# Patient Record
Sex: MALE | Race: BLACK OR AFRICAN AMERICAN | NOT HISPANIC OR LATINO | Employment: UNEMPLOYED | URBAN - METROPOLITAN AREA
[De-identification: names, ages, dates, MRNs, and addresses within clinical notes are randomized per-mention and may not be internally consistent; named-entity substitution may affect disease eponyms.]

---

## 2019-11-19 ENCOUNTER — HOSPITAL ENCOUNTER (EMERGENCY)
Facility: HOSPITAL | Age: 39
Discharge: HOME | End: 2019-11-19
Attending: EMERGENCY MEDICINE | Admitting: EMERGENCY MEDICINE
Payer: MEDICARE

## 2019-11-19 VITALS
HEART RATE: 80 BPM | OXYGEN SATURATION: 100 % | BODY MASS INDEX: 22 KG/M2 | WEIGHT: 136.91 LBS | RESPIRATION RATE: 20 BRPM | SYSTOLIC BLOOD PRESSURE: 143 MMHG | HEIGHT: 66 IN | DIASTOLIC BLOOD PRESSURE: 90 MMHG | TEMPERATURE: 96.7 F

## 2019-11-19 DIAGNOSIS — M62.838 TRAPEZIUS MUSCLE SPASM: Primary | ICD-10-CM

## 2019-11-19 LAB
ATRIAL RATE: 65 BPM
P AXIS: 31 DEGREES
PR INTERVAL: 140 MS
QRS AXIS: 27 DEGREES
QRSD INTERVAL: 80 MS
QT INTERVAL: 372 MS
QTC INTERVAL: 386 MS
T WAVE AXIS: 71 DEGREES
VENTRICULAR RATE: 65 BPM

## 2019-11-19 PROCEDURE — 96372 THER/PROPH/DIAG INJ SC/IM: CPT

## 2019-11-19 PROCEDURE — 99283 EMERGENCY DEPT VISIT LOW MDM: CPT

## 2019-11-19 PROCEDURE — 99284 EMERGENCY DEPT VISIT MOD MDM: CPT | Performed by: EMERGENCY MEDICINE

## 2019-11-19 PROCEDURE — 93010 ELECTROCARDIOGRAM REPORT: CPT | Performed by: INTERNAL MEDICINE

## 2019-11-19 PROCEDURE — 93005 ELECTROCARDIOGRAM TRACING: CPT

## 2019-11-19 RX ORDER — DIAZEPAM 2 MG/1
2 TABLET ORAL ONCE
Status: COMPLETED | OUTPATIENT
Start: 2019-11-19 | End: 2019-11-19

## 2019-11-19 RX ORDER — DEXAMETHASONE SODIUM PHOSPHATE 4 MG/ML
4 INJECTION, SOLUTION INTRA-ARTICULAR; INTRALESIONAL; INTRAMUSCULAR; INTRAVENOUS; SOFT TISSUE ONCE
Status: COMPLETED | OUTPATIENT
Start: 2019-11-19 | End: 2019-11-19

## 2019-11-19 RX ORDER — KETOROLAC TROMETHAMINE 30 MG/ML
30 INJECTION, SOLUTION INTRAMUSCULAR; INTRAVENOUS ONCE
Status: COMPLETED | OUTPATIENT
Start: 2019-11-19 | End: 2019-11-19

## 2019-11-19 RX ORDER — PHENYTOIN SODIUM 100 MG/1
100 CAPSULE, EXTENDED RELEASE ORAL EVERY 8 HOURS SCHEDULED
COMMUNITY

## 2019-11-19 RX ADMIN — DEXAMETHASONE SODIUM PHOSPHATE 4 MG: 4 INJECTION, SOLUTION INTRAMUSCULAR; INTRAVENOUS at 06:14

## 2019-11-19 RX ADMIN — DIAZEPAM 2 MG: 2 TABLET ORAL at 06:20

## 2019-11-19 RX ADMIN — KETOROLAC TROMETHAMINE 30 MG: 30 INJECTION, SOLUTION INTRAMUSCULAR; INTRAVENOUS at 06:15

## 2019-11-19 NOTE — ED PROVIDER NOTES
History  Chief Complaint   Patient presents with    Shoulder Pain     Left shoulder pain and left neck pain  Denies trauma     HPI    This is a very pleasant 42-year-old gentleman presents to the emergency department with left shoulder left chest PC is muscle spasm which occurred when he woke up this morning  Patient denies trauma  Patient denies doing any particular activity that could have exacerbated it  Pain radiates to the left shoulder up trapezius muscle of to the left side of the neck  Prior to Admission Medications   Prescriptions Last Dose Informant Patient Reported? Taking? phenytoin (DILANTIN) 100 mg ER capsule   Yes No   Sig: Take 100 mg by mouth every 8 (eight) hours       Facility-Administered Medications: None       Past Medical History:   Diagnosis Date    Anxiety     Depression     Head injury     Seizures ([facility] [address] )        Past Surgical History:   Procedure Laterality Date    BRAIN SURGERY         History reviewed  No pertinent family history  I have reviewed and agree with the history as documented  Social History     Tobacco Use    Smoking status: Current Every Day Smoker     Packs/day: 0 20     Types: Cigarettes    Smokeless tobacco: Never Used   Substance Use Topics    Alcohol use: Never     Frequency: Never    Drug use: Yes     Types: Marijuana        Review of Systems   Constitutional: Negative  HENT: Negative  Eyes: Negative  Respiratory: Negative  Cardiovascular: Negative  Gastrointestinal: Negative  Endocrine: Negative  Genitourinary: Negative  Musculoskeletal: Positive for neck pain and neck stiffness  Skin: Negative  Allergic/Immunologic: Negative  Neurological: Negative  Hematological: Negative  Psychiatric/Behavioral: Negative  Physical Exam  Physical Exam   Constitutional: He appears well-developed and well-nourished  HENT:   Head: Normocephalic and atraumatic     Right Ear: External ear normal    Left Ear: External ear normal    Nose: Nose normal    Mouth/Throat: Oropharynx is clear and moist    Eyes: Pupils are equal, round, and reactive to light  Conjunctivae and EOM are normal    Neck: Normal range of motion  Neck supple  Cardiovascular: Normal rate, regular rhythm and normal heart sounds  Pulmonary/Chest: Effort normal and breath sounds normal    Abdominal: Soft  Musculoskeletal: He exhibits tenderness  Right shoulder: He exhibits tenderness  He exhibits no swelling and no effusion  Arms:  Tissue texture changes with increased muscle spasm along the trapezius muscle group  Nursing note and vitals reviewed  Vital Signs  ED Triage Vitals [11/19/19 0531]   Temperature Pulse Respirations Blood Pressure SpO2   (!) 96 7 °F (35 9 °C) 80 20 143/90 100 %      Temp Source Heart Rate Source Patient Position - Orthostatic VS BP Location FiO2 (%)   Tympanic Monitor Sitting Left arm --      Pain Score       8           Vitals:    11/19/19 0531   BP: 143/90   Pulse: 80   Patient Position - Orthostatic VS: Sitting         Visual Acuity      ED Medications  Medications   diazepam (VALIUM) tablet 2 mg (has no administration in time range)   ketorolac (TORADOL) injection 30 mg (has no administration in time range)   dexamethasone (DECADRON) injection 4 mg (has no administration in time range)       Diagnostic Studies  Results Reviewed     None                 No orders to display              Procedures  ECG 12 Lead Documentation Only  Date/Time: 11/19/2019 5:47 AM  Performed by: Yordy Chong III, DO  Authorized by: Yordy Chong III, DO     Indications / Diagnosis:  L shoulder pain  ECG reviewed by me, the ED Provider: yes    Patient location:  ED  Comments:      I personally reviewed this EKG is performed the patient November 19, 2019 at 5:43 a m  EKG was interpreted by me at 5:47 a m   Normal sinus rhythm with a ventricular rate of 65 beats per minute  A p r n  Oval of 140 milliseconds    The QRS duration of 80 milliseconds  The QT interval 372 milliseconds with a QTC of 386 milliseconds  Please note there is diffuse ST flattening in the anterior lateral leads  ED Course                               MDM  Number of Diagnoses or Management Options  Trapezius muscle spasm:   Diagnosis management comments: This is a very pleasant 40-year-old gentle presents to the emergency department with left shoulder pain left trapezius muscle spasm left-sided the posterior neck pain  Patient denies any trauma recently to the area  Patient woke up like this did not take any medications to alleviate his pain prior to arrival to the emergency department  EKG showed no acute changes  Patient is denying any chest pain  Patient does have full range of motion but it is decreased  No clinical indication of per form an x-ray because patient has full range of motion is no history of trauma  Portions of the record may have been created with voice recognition software  Occasional wrong word or "sound a like" substitutions may have occurred due to the inherent limitations of voice recognition software  Read the chart carefully and recognize, using context, where substitutions have occurred  Counseling: I had a detailed discussion with the patient and/or guardian regarding: the historical points, exam findings, and any diagnostic results supporting the discharge diagnosis, lab results, radiology results, discharge instructions reviewed with patient and/or family/caregiver and understanding was verbalized   Instructions given to return to the emergency department if symptoms worsen or persist, or if there are any questions or concerns that arise at home        Disposition  Final diagnoses:   Trapezius muscle spasm     Time reflects when diagnosis was documented in both MDM as applicable and the Disposition within this note     Time User Action Codes Description Comment    11/19/2019  5:56 AM Bere Magana Add [A72 [id number]] Trapezius muscle spasm       ED Disposition     ED Disposition Condition Date/Time Comment    Discharge Stable Tue Nov 19, 2019  5:56 AM Marisol Prom discharge to home/self care  Follow-up Information     Follow up With Specialties Details Why Contact Info    [facility]   [address] [place] [address]  [place] [address] [facility]  [phone]            Patient's Medications   Discharge Prescriptions    No medications on file     No discharge procedures on file      ED Provider  Electronically Signed by           Timmy Wallace III, DO  11/19/19 6212

## 2019-11-24 ENCOUNTER — HOSPITAL ENCOUNTER (EMERGENCY)
Facility: HOSPITAL | Age: 39
Discharge: HOME | End: 2019-11-24
Attending: EMERGENCY MEDICINE
Payer: MEDICARE

## 2019-11-24 ENCOUNTER — APPOINTMENT (EMERGENCY)
Dept: RADIOLOGY | Facility: HOSPITAL | Age: 39
End: 2019-11-24
Payer: MEDICARE

## 2019-11-24 VITALS
TEMPERATURE: 96.1 F | RESPIRATION RATE: 28 BRPM | SYSTOLIC BLOOD PRESSURE: 133 MMHG | BODY MASS INDEX: 21.35 KG/M2 | OXYGEN SATURATION: 100 % | WEIGHT: 132.28 LBS | DIASTOLIC BLOOD PRESSURE: 81 MMHG | HEART RATE: 90 BPM

## 2019-11-24 DIAGNOSIS — F41.9 ANXIETY: Primary | ICD-10-CM

## 2019-11-24 LAB
ANION GAP SERPL CALCULATED.3IONS-SCNC: 9 MMOL/L (ref 5–14)
BASOPHILS # BLD AUTO: 0.09 THOUSAND/UL (ref 0–0.1)
BASOPHILS NFR MAR MANUAL: 2 % (ref 0–1)
BUN SERPL-MCNC: 12 MG/DL (ref 5–25)
CALCIUM SERPL-MCNC: 9.1 MG/DL (ref 8.4–10.2)
CHLORIDE SERPL-SCNC: 107 MMOL/L (ref 97–108)
CO2 SERPL-SCNC: 28 MMOL/L (ref 22–30)
CREAT SERPL-MCNC: 0.74 MG/DL (ref 0.7–1.5)
EOSINOPHIL # BLD AUTO: 0.05 THOUSAND/UL (ref 0–0.4)
EOSINOPHIL NFR BLD MANUAL: 1 % (ref 0–6)
ERYTHROCYTE [DISTWIDTH] IN BLOOD BY AUTOMATED COUNT: 14.3 %
ETHANOL SERPL-MCNC: 42 MG/DL (ref 0–10)
GFR SERPL CREATININE-BSD FRML MDRD: 132 ML/MIN/1.73SQ M
GLUCOSE SERPL-MCNC: 124 MG/DL (ref 70–99)
HCT VFR BLD AUTO: 38.8 % (ref 41–53)
HGB BLD-MCNC: 12 G/DL (ref 13.5–17.5)
LYMPHOCYTES # BLD AUTO: 2.21 THOUSAND/UL (ref 0.5–4)
LYMPHOCYTES # BLD AUTO: 48 % (ref 25–45)
MCH RBC QN AUTO: 23.5 PG (ref 26–34)
MCHC RBC AUTO-ENTMCNC: 30.9 G/DL (ref 31–36)
MCV RBC AUTO: 76 FL (ref 80–100)
MICROCYTES BLD QL AUTO: PRESENT
MONOCYTES # BLD AUTO: 0.32 THOUSAND/UL (ref 0.2–0.9)
MONOCYTES NFR BLD AUTO: 7 % (ref 1–10)
NEUTS SEG # BLD: 1.89 THOUSAND/UL (ref 1.8–7.8)
NEUTS SEG NFR BLD AUTO: 41 %
PLATELET # BLD AUTO: 129 THOUSANDS/UL (ref 150–450)
PLATELET BLD QL SMEAR: ABNORMAL
PMV BLD AUTO: 9.6 FL (ref 8.9–12.7)
POTASSIUM SERPL-SCNC: 4.3 MMOL/L (ref 3.6–5)
RBC # BLD AUTO: 5.09 MILLION/UL (ref 4.5–5.9)
RBC MORPH BLD: ABNORMAL
SODIUM SERPL-SCNC: 144 MMOL/L (ref 137–147)
TARGETS BLD QL SMEAR: PRESENT
TOTAL CELLS COUNTED SPEC: 100
TROPONIN I SERPL-MCNC: <0.01 NG/ML (ref 0–0.03)
VARIANT LYMPHS # BLD AUTO: 1 % (ref 0–0)
WBC # BLD AUTO: 4.6 THOUSAND/UL (ref 4.5–11)

## 2019-11-24 PROCEDURE — 93005 ELECTROCARDIOGRAM TRACING: CPT

## 2019-11-24 PROCEDURE — 96374 THER/PROPH/DIAG INJ IV PUSH: CPT

## 2019-11-24 PROCEDURE — 85007 BL SMEAR W/DIFF WBC COUNT: CPT | Performed by: EMERGENCY MEDICINE

## 2019-11-24 PROCEDURE — 84484 ASSAY OF TROPONIN QUANT: CPT | Performed by: EMERGENCY MEDICINE

## 2019-11-24 PROCEDURE — 80048 BASIC METABOLIC PNL TOTAL CA: CPT | Performed by: EMERGENCY MEDICINE

## 2019-11-24 PROCEDURE — 99284 EMERGENCY DEPT VISIT MOD MDM: CPT | Performed by: EMERGENCY MEDICINE

## 2019-11-24 PROCEDURE — 80320 DRUG SCREEN QUANTALCOHOLS: CPT | Performed by: EMERGENCY MEDICINE

## 2019-11-24 PROCEDURE — 85027 COMPLETE CBC AUTOMATED: CPT | Performed by: EMERGENCY MEDICINE

## 2019-11-24 PROCEDURE — 36415 COLL VENOUS BLD VENIPUNCTURE: CPT | Performed by: EMERGENCY MEDICINE

## 2019-11-24 PROCEDURE — 71045 X-RAY EXAM CHEST 1 VIEW: CPT

## 2019-11-24 PROCEDURE — 99285 EMERGENCY DEPT VISIT HI MDM: CPT

## 2019-11-24 RX ORDER — HYDROXYZINE HYDROCHLORIDE 25 MG/1
25 TABLET, FILM COATED ORAL EVERY 6 HOURS PRN
Qty: 16 TABLET | Refills: 0 | Status: SHIPPED | OUTPATIENT
Start: 2019-11-24

## 2019-11-24 RX ORDER — LORAZEPAM 2 MG/ML
1 INJECTION INTRAMUSCULAR ONCE
Status: COMPLETED | OUTPATIENT
Start: 2019-11-24 | End: 2019-11-24

## 2019-11-24 RX ADMIN — LORAZEPAM 1 MG: 2 INJECTION INTRAMUSCULAR at 07:48

## 2019-11-24 NOTE — ED PROVIDER NOTES
History  Chief Complaint   Patient presents with    Shortness of Breath     " woke up this way"  c/o trouble breathing this morning  denies injury  denies cough     Patient is a 27-year-old male with history of anxiety and previous traumatic brain injury who presents with acute onset of dyspnea this morning  States woke up short of breath  Denies any history of asthma  No similar symptoms  Admits that he went out last night only had drink  Denies any other drug use  States smokes upwards of only 3 cigarettes a day  Denies any chest pain  No cough  No nasal congestion  No history of similar symptoms  Nothing makes it better or worse  Prior to Admission Medications   Prescriptions Last Dose Informant Patient Reported? Taking? phenytoin (DILANTIN) 100 mg ER capsule   Yes No   Sig: Take 100 mg by mouth every 8 (eight) hours       Facility-Administered Medications: None       Past Medical History:   Diagnosis Date    Anxiety     Depression     Head injury     Seizures ([facility] [address] )        Past Surgical History:   Procedure Laterality Date    BRAIN SURGERY         History reviewed  No pertinent family history  I have reviewed and agree with the history as documented  Social History     Tobacco Use    Smoking status: Current Every Day Smoker     Packs/day: 0 20     Types: Cigarettes    Smokeless tobacco: Never Used   Substance Use Topics    Alcohol use: Yes     Frequency: Never    Drug use: Yes     Types: Marijuana        Review of Systems   Constitutional: Negative  HENT: Negative  Eyes: Negative  Respiratory: Positive for shortness of breath  Cardiovascular: Negative  Negative for chest pain  Gastrointestinal: Negative  Negative for abdominal pain  Endocrine: Negative  Genitourinary: Negative  Musculoskeletal: Negative  Skin: Negative  Allergic/Immunologic: Negative  Neurological: Negative  Hematological: Negative  Psychiatric/Behavioral: Negative      All other systems reviewed and are negative  Physical Exam  Physical Exam   Constitutional: He is oriented to person, place, and time  He appears well-developed and well-nourished  Patient has smell of alcohol on his breath  HENT:   Head: Normocephalic and atraumatic  Mouth/Throat: Oropharynx is clear and moist    Eyes: Pupils are equal, round, and reactive to light  Neck: Normal range of motion  Neck supple  Cardiovascular: Normal rate, regular rhythm and normal heart sounds  Pulmonary/Chest: Breath sounds normal  Tachypnea noted  He has no decreased breath sounds  He has no wheezes  He has no rhonchi  He has no rales  Abdominal: Soft  Bowel sounds are normal    Musculoskeletal: Normal range of motion  Right lower leg: Normal  He exhibits no tenderness and no edema  Left lower leg: Normal  He exhibits no tenderness and no edema  Neurological: He is alert and oriented to person, place, and time  Skin: Skin is warm and dry  Capillary refill takes less than 2 seconds  Psychiatric: His mood appears anxious  Nursing note and vitals reviewed        Vital Signs  ED Triage Vitals [11/24/19 0720]   Temperature Pulse Respirations Blood Pressure SpO2   (!) 96 1 °F (35 6 °C) 90 (!) 28 133/81 100 %      Temp Source Heart Rate Source Patient Position - Orthostatic VS BP Location FiO2 (%)   Tympanic Monitor Sitting Left arm --      Pain Score       --           Vitals:    11/24/19 0720   BP: 133/81   Pulse: 90   Patient Position - Orthostatic VS: Sitting         Visual Acuity      ED Medications  Medications   LORazepam (ATIVAN) 2 mg/mL injection 1 mg (1 mg Intravenous Given 11/24/19 0748)       Diagnostic Studies  Results Reviewed     Procedure Component Value Units Date/Time    Troponin I [[id number]]  (Normal) Collected:  11/24/19 0734    Lab Status:  Final result Specimen:  Blood from Arm, Left Updated:  11/24/19 0808     Troponin I <0 01 ng/mL     Basic metabolic panel [[id number]] (Abnormal) Collected:  11/24/19 0734    Lab Status:  Final result Specimen:  Blood from Arm, Left Updated:  11/24/19 0753     Sodium 144 mmol/L      Potassium 4 3 mmol/L      Chloride 107 mmol/L      CO2 28 mmol/L      ANION GAP 9 mmol/L      BUN 12 mg/dL      Creatinine 0 74 mg/dL      Glucose 124 mg/dL      Calcium 9 1 mg/dL      eGFR 132 ml/min/1 73sq m     Narrative:       Hemolysis  National Kidney Disease Foundation guidelines for Chronic Kidney Disease (CKD):     Stage 1 with normal or high GFR (GFR > 90 mL/min/1 73 square meters)    Stage 2 Mild CKD (GFR = 60-89 mL/min/1 73 square meters)    Stage 3A Moderate CKD (GFR = 45-59 mL/min/1 73 square meters)    Stage 3B Moderate CKD (GFR = 30-44 mL/min/1 73 square meters)    Stage 4 Severe CKD (GFR = 15-29 mL/min/1 73 square meters)    Stage 5 End Stage CKD (GFR <15 mL/min/1 73 square meters)  Note: GFR calculation is accurate only with a steady state creatinine    Ethanol [[id number]]  (Abnormal) Collected:  11/24/19 0734    Lab Status:  Final result Specimen:  Blood from Arm, Left Updated:  11/24/19 0752     Ethanol Lvl 42 mg/dL     CBC and differential [[id number]]  (Abnormal) Collected:  11/24/19 0734    Lab Status:  Final result Specimen:  Blood from Arm, Left Updated:  11/24/19 0747     WBC 4 60 Thousand/uL      RBC 5 09 Million/uL      Hemoglobin 12 0 g/dL      Hematocrit 38 8 %      MCV 76 fL      MCH 23 5 pg      MCHC 30 9 g/dL      RDW 14 3 %      MPV 9 6 fL      Platelets 294 Thousands/uL                  XR chest portable   Final Result by Lamar Engel MD (11/24 0468)      No acute cardiopulmonary disease  Workstation performed: [id number]                    Procedures  Procedures       ED Course  ED Course as of Nov 24 0906   Fay Nnamdi Nov 24, 2019   9700 Tachypnea not consistent  When patient is distracted breathing slowed  [facility] with patient  One over results  Most likely anxiety at this time    Patient asking for medication  Will discharge with hydroxyzine follow up with his PCP on [address]  Return to the ER for any concerns  MDM  Number of Diagnoses or Management Options  Anxiety:      Amount and/or Complexity of Data Reviewed  Clinical lab tests: ordered and reviewed  Tests in the radiology section of CPT®: ordered and reviewed  Tests in the medicine section of CPT®: ordered and reviewed  Independent visualization of images, tracings, or specimens: yes        Disposition  Final diagnoses:   Anxiety     Time reflects when diagnosis was documented in both MDM as applicable and the Disposition within this note     Time User Action Codes Description Comment    11/24/2019  9:04 AM Adriana Virgen Add [F41 9] Anxiety       ED Disposition     ED Disposition Condition Date/Time Comment    Discharge Stable Sun Nov 24, 2019  9:04 AM Lashanda Sterling discharge to home/self care  Follow-up Information     Follow up With Specialties Details Why Contact Info        Please follow up with your doctor  Patient's Medications   Discharge Prescriptions    HYDROXYZINE HCL (ATARAX) 25 MG TABLET    Take 1 tablet (25 mg total) by mouth every 6 (six) hours as needed for itching       Start Date: 11/24/2019End Date: --       Order Dose: 25 mg       Quantity: 16 tablet    Refills: 0     No discharge procedures on file      ED Provider  Electronically Signed by           Fatmata Powers MD  11/24/19 6266

## 2019-11-24 NOTE — ED PROCEDURE NOTE
PROCEDURE  ECG 12 Lead Documentation Only  Date/Time: 11/24/2019 7:36 AM  Performed by: David Cantrell MD  Authorized by: David Cantrell MD     Indications / Diagnosis:  Dyspnea  ECG reviewed by me, the ED Provider: yes    Patient location:  ED  Interpretation:     Interpretation: normal    Rate:     ECG rate:  75    ECG rate assessment: normal    Rhythm:     Rhythm: sinus rhythm    Ectopy:     Ectopy: none    QRS:     QRS axis:  Normal    QRS intervals:  Normal  Conduction:     Conduction: normal    ST segments:     ST segments:  Normal  T waves:     T waves: normal           David Cantrell MD  11/24/19 9229

## 2019-11-25 LAB
ATRIAL RATE: 75 BPM
P AXIS: 61 DEGREES
PR INTERVAL: 118 MS
QRS AXIS: 31 DEGREES
QRSD INTERVAL: 76 MS
QT INTERVAL: 376 MS
QTC INTERVAL: 419 MS
T WAVE AXIS: 78 DEGREES
VENTRICULAR RATE: 75 BPM

## 2019-11-25 PROCEDURE — 93010 ELECTROCARDIOGRAM REPORT: CPT | Performed by: INTERNAL MEDICINE

## 2022-07-12 ENCOUNTER — HOSPITAL ENCOUNTER (EMERGENCY)
Facility: HOSPITAL | Age: 42
Discharge: HOME | End: 2022-07-12
Attending: EMERGENCY MEDICINE
Payer: MEDICARE

## 2022-07-12 ENCOUNTER — APPOINTMENT (EMERGENCY)
Dept: RADIOLOGY | Facility: HOSPITAL | Age: 42
End: 2022-07-12
Payer: MEDICARE

## 2022-07-12 VITALS
SYSTOLIC BLOOD PRESSURE: 133 MMHG | TEMPERATURE: 97.4 F | DIASTOLIC BLOOD PRESSURE: 90 MMHG | HEART RATE: 80 BPM | OXYGEN SATURATION: 99 % | RESPIRATION RATE: 17 BRPM

## 2022-07-12 DIAGNOSIS — S61.411A LACERATION OF RIGHT HAND WITHOUT FOREIGN BODY, INITIAL ENCOUNTER: Primary | ICD-10-CM

## 2022-07-12 PROCEDURE — 73130 X-RAY EXAM OF HAND: CPT

## 2022-07-12 PROCEDURE — 90715 TDAP VACCINE 7 YRS/> IM: CPT | Performed by: EMERGENCY MEDICINE

## 2022-07-12 PROCEDURE — 99284 EMERGENCY DEPT VISIT MOD MDM: CPT | Performed by: EMERGENCY MEDICINE

## 2022-07-12 PROCEDURE — 90471 IMMUNIZATION ADMIN: CPT

## 2022-07-12 PROCEDURE — 12041 INTMD RPR N-HF/GENIT 2.5CM/<: CPT | Performed by: EMERGENCY MEDICINE

## 2022-07-12 PROCEDURE — 99283 EMERGENCY DEPT VISIT LOW MDM: CPT

## 2022-07-12 RX ORDER — BACITRACIN, NEOMYCIN, POLYMYXIN B 400; 3.5; 5 [USP'U]/G; MG/G; [USP'U]/G
1 OINTMENT TOPICAL ONCE
Status: COMPLETED | OUTPATIENT
Start: 2022-07-12 | End: 2022-07-12

## 2022-07-12 RX ORDER — LIDOCAINE HYDROCHLORIDE AND EPINEPHRINE 10; 10 MG/ML; UG/ML
1 INJECTION, SOLUTION INFILTRATION; PERINEURAL ONCE
Status: COMPLETED | OUTPATIENT
Start: 2022-07-12 | End: 2022-07-12

## 2022-07-12 RX ADMIN — LIDOCAINE HYDROCHLORIDE,EPINEPHRINE BITARTRATE 1 ML: 10; .01 INJECTION, SOLUTION INFILTRATION; PERINEURAL at 05:47

## 2022-07-12 RX ADMIN — BACITRACIN ZINC, NEOMYCIN, POLYMYXIN B 1 SMALL APPLICATION: 400; 3.5; 5 OINTMENT TOPICAL at 05:48

## 2022-07-12 RX ADMIN — TETANUS TOXOID, REDUCED DIPHTHERIA TOXOID AND ACELLULAR PERTUSSIS VACCINE, ADSORBED 0.5 ML: 5; 2.5; 8; 8; 2.5 SUSPENSION INTRAMUSCULAR at 04:48
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
## 2022-07-12 NOTE — Clinical Note
Kaleigh Wallace was seen and treated in our emergency department on 7/12/2022  Diagnosis:     Alma Crum  may return to work on return date  He may return on this date: 07/19/2022         If you have any questions or concerns, please don't hesitate to call        Vitor Fink RN    ______________________________           _______________          _______________  Hospital Representative                              Date                                Time

## 2022-07-13 ENCOUNTER — HOSPITAL ENCOUNTER (EMERGENCY)
Facility: HOSPITAL | Age: 42
Discharge: HOME | End: 2022-07-13
Attending: EMERGENCY MEDICINE
Payer: MEDICARE

## 2022-07-13 VITALS
DIASTOLIC BLOOD PRESSURE: 95 MMHG | OXYGEN SATURATION: 99 % | SYSTOLIC BLOOD PRESSURE: 128 MMHG | WEIGHT: 142.1 LBS | BODY MASS INDEX: 22.94 KG/M2 | RESPIRATION RATE: 18 BRPM | TEMPERATURE: 97.5 F | HEART RATE: 92 BPM

## 2022-07-13 DIAGNOSIS — Z51.89 VISIT FOR WOUND CHECK: Primary | ICD-10-CM

## 2022-07-13 PROCEDURE — 99024 POSTOP FOLLOW-UP VISIT: CPT | Performed by: EMERGENCY MEDICINE

## 2022-07-13 PROCEDURE — 99282 EMERGENCY DEPT VISIT SF MDM: CPT

## 2022-07-13 RX ORDER — TRAMADOL HYDROCHLORIDE 50 MG/1
50 TABLET ORAL EVERY 8 HOURS PRN
Qty: 9 TABLET | Refills: 0 | Status: SHIPPED | OUTPATIENT
Start: 2022-07-13 | End: 2022-07-16
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
## 2022-07-13 NOTE — Clinical Note
Deshawn Lee was seen and treated in our emergency department on 7/13/2022  Diagnosis:     Abhinav Elver    He may return on this date: 07/16/2022         If you have any questions or concerns, please don't hesitate to call        Heber Burden,     ______________________________           _______________          _______________  Hospital Representative                              Date                                Time

## 2022-08-21 ENCOUNTER — HOSPITAL ENCOUNTER (EMERGENCY)
Facility: HOSPITAL | Age: 42
Discharge: HOME | End: 2022-08-21
Attending: EMERGENCY MEDICINE
Payer: MEDICARE

## 2022-08-21 ENCOUNTER — APPOINTMENT (OUTPATIENT)
Dept: RADIOLOGY | Facility: HOSPITAL | Age: 42
End: 2022-08-21
Payer: MEDICARE

## 2022-08-21 ENCOUNTER — APPOINTMENT (EMERGENCY)
Dept: CT IMAGING | Facility: HOSPITAL | Age: 42
End: 2022-08-21
Payer: MEDICARE

## 2022-08-21 VITALS
HEIGHT: 66 IN | BODY MASS INDEX: 23.13 KG/M2 | DIASTOLIC BLOOD PRESSURE: 52 MMHG | SYSTOLIC BLOOD PRESSURE: 125 MMHG | HEART RATE: 91 BPM | OXYGEN SATURATION: 100 % | RESPIRATION RATE: 18 BRPM | TEMPERATURE: 98.1 F

## 2022-08-21 DIAGNOSIS — R53.1 WEAKNESS: Primary | ICD-10-CM

## 2022-08-21 DIAGNOSIS — G40.909 SEIZURE DISORDER (HCC): ICD-10-CM

## 2022-08-21 LAB
ALBUMIN SERPL BCP-MCNC: 3.8 G/DL (ref 3.5–5)
ALP SERPL-CCNC: 65 U/L (ref 34–104)
ALT SERPL W P-5'-P-CCNC: 15 U/L (ref 7–52)
ANION GAP SERPL CALCULATED.3IONS-SCNC: 7 MMOL/L (ref 4–13)
APAP SERPL-MCNC: <10 UG/ML (ref 10–20)
AST SERPL W P-5'-P-CCNC: 16 U/L (ref 13–39)
ATRIAL RATE: 87 BPM
BASOPHILS # BLD AUTO: 0.03 THOUSANDS/ΜL (ref 0–0.1)
BASOPHILS NFR BLD AUTO: 1 % (ref 0–1)
BILIRUB SERPL-MCNC: 0.38 MG/DL (ref 0.2–1)
BUN SERPL-MCNC: 8 MG/DL (ref 5–25)
CALCIUM SERPL-MCNC: 8.3 MG/DL (ref 8.4–10.2)
CARDIAC TROPONIN I PNL SERPL HS: 3 NG/L
CHLORIDE SERPL-SCNC: 107 MMOL/L (ref 96–108)
CO2 SERPL-SCNC: 26 MMOL/L (ref 21–32)
CREAT SERPL-MCNC: 0.69 MG/DL (ref 0.6–1.3)
EOSINOPHIL # BLD AUTO: 0.12 THOUSAND/ΜL (ref 0–0.61)
EOSINOPHIL NFR BLD AUTO: 2 % (ref 0–6)
ERYTHROCYTE [DISTWIDTH] IN BLOOD BY AUTOMATED COUNT: 14.4 % (ref 11.6–15.1)
ETHANOL SERPL-MCNC: 21 MG/DL
GFR SERPL CREATININE-BSD FRML MDRD: 115 ML/MIN/1.73SQ M
GLUCOSE SERPL-MCNC: 101 MG/DL (ref 65–140)
GLUCOSE SERPL-MCNC: 75 MG/DL (ref 65–140)
HCT VFR BLD AUTO: 36.4 % (ref 36.5–49.3)
HGB BLD-MCNC: 11.1 G/DL (ref 12–17)
IMM GRANULOCYTES # BLD AUTO: 0.01 THOUSAND/UL (ref 0–0.2)
IMM GRANULOCYTES NFR BLD AUTO: 0 % (ref 0–2)
LYMPHOCYTES # BLD AUTO: 2.12 THOUSANDS/ΜL (ref 0.6–4.47)
LYMPHOCYTES NFR BLD AUTO: 38 % (ref 14–44)
MCH RBC QN AUTO: 23.7 PG (ref 26.8–34.3)
MCHC RBC AUTO-ENTMCNC: 30.5 G/DL (ref 31.4–37.4)
MCV RBC AUTO: 78 FL (ref 82–98)
MONOCYTES # BLD AUTO: 0.64 THOUSAND/ΜL (ref 0.17–1.22)
MONOCYTES NFR BLD AUTO: 12 % (ref 4–12)
NEUTROPHILS # BLD AUTO: 2.62 THOUSANDS/ΜL (ref 1.85–7.62)
NEUTS SEG NFR BLD AUTO: 47 % (ref 43–75)
NRBC BLD AUTO-RTO: 0 /100 WBCS
P AXIS: 60 DEGREES
PHENYTOIN SERPL-MCNC: <2.5 UG/ML (ref 10–20)
PLATELET # BLD AUTO: 106 THOUSANDS/UL (ref 149–390)
POTASSIUM SERPL-SCNC: 3.5 MMOL/L (ref 3.5–5.3)
PR INTERVAL: 148 MS
PROT SERPL-MCNC: 6.9 G/DL (ref 6.4–8.4)
QRS AXIS: 63 DEGREES
QRSD INTERVAL: 76 MS
QT INTERVAL: 350 MS
QTC INTERVAL: 421 MS
RBC # BLD AUTO: 4.69 MILLION/UL (ref 3.88–5.62)
SALICYLATES SERPL-MCNC: <5 MG/DL (ref 3–20)
SODIUM SERPL-SCNC: 140 MMOL/L (ref 135–147)
T WAVE AXIS: 61 DEGREES
TSH SERPL DL<=0.05 MIU/L-ACNC: 3.17 UIU/ML (ref 0.45–4.5)
VENTRICULAR RATE: 87 BPM
WBC # BLD AUTO: 5.54 THOUSAND/UL (ref 4.31–10.16)

## 2022-08-21 PROCEDURE — 85025 COMPLETE CBC W/AUTO DIFF WBC: CPT | Performed by: EMERGENCY MEDICINE

## 2022-08-21 PROCEDURE — 82948 REAGENT STRIP/BLOOD GLUCOSE: CPT

## 2022-08-21 PROCEDURE — 99285 EMERGENCY DEPT VISIT HI MDM: CPT

## 2022-08-21 PROCEDURE — 99285 EMERGENCY DEPT VISIT HI MDM: CPT | Performed by: EMERGENCY MEDICINE

## 2022-08-21 PROCEDURE — G1004 CDSM NDSC: HCPCS

## 2022-08-21 PROCEDURE — 71045 X-RAY EXAM CHEST 1 VIEW: CPT

## 2022-08-21 PROCEDURE — 36415 COLL VENOUS BLD VENIPUNCTURE: CPT | Performed by: EMERGENCY MEDICINE

## 2022-08-21 PROCEDURE — 93005 ELECTROCARDIOGRAM TRACING: CPT

## 2022-08-21 PROCEDURE — 80053 COMPREHEN METABOLIC PANEL: CPT | Performed by: EMERGENCY MEDICINE

## 2022-08-21 PROCEDURE — 80143 DRUG ASSAY ACETAMINOPHEN: CPT | Performed by: EMERGENCY MEDICINE

## 2022-08-21 PROCEDURE — 96365 THER/PROPH/DIAG IV INF INIT: CPT

## 2022-08-21 PROCEDURE — 96375 TX/PRO/DX INJ NEW DRUG ADDON: CPT

## 2022-08-21 PROCEDURE — 80179 DRUG ASSAY SALICYLATE: CPT | Performed by: EMERGENCY MEDICINE

## 2022-08-21 PROCEDURE — 84443 ASSAY THYROID STIM HORMONE: CPT | Performed by: EMERGENCY MEDICINE

## 2022-08-21 PROCEDURE — 82077 ASSAY SPEC XCP UR&BREATH IA: CPT | Performed by: EMERGENCY MEDICINE

## 2022-08-21 PROCEDURE — 96366 THER/PROPH/DIAG IV INF ADDON: CPT

## 2022-08-21 PROCEDURE — 70450 CT HEAD/BRAIN W/O DYE: CPT

## 2022-08-21 PROCEDURE — 80185 ASSAY OF PHENYTOIN TOTAL: CPT | Performed by: EMERGENCY MEDICINE

## 2022-08-21 PROCEDURE — 93010 ELECTROCARDIOGRAM REPORT: CPT | Performed by: INTERNAL MEDICINE

## 2022-08-21 PROCEDURE — 84484 ASSAY OF TROPONIN QUANT: CPT | Performed by: EMERGENCY MEDICINE

## 2022-08-21 RX ORDER — PHENYTOIN SODIUM 100 MG/1
100 CAPSULE, EXTENDED RELEASE ORAL 3 TIMES DAILY
Qty: 30 CAPSULE | Refills: 0 | Status: SHIPPED | OUTPATIENT
Start: 2022-08-21 | End: 2022-08-31

## 2022-08-21 RX ORDER — LORAZEPAM 2 MG/ML
1 INJECTION INTRAMUSCULAR ONCE
Status: COMPLETED | OUTPATIENT
Start: 2022-08-21 | End: 2022-08-21

## 2022-08-21 RX ADMIN — PHENYTOIN SODIUM 1300 MG: 50 INJECTION INTRAMUSCULAR; INTRAVENOUS at 07:07

## 2022-08-21 RX ADMIN — LORAZEPAM 1 MG: 2 INJECTION INTRAMUSCULAR; INTRAVENOUS at 06:00

## 2022-08-21 NOTE — ED PROVIDER NOTES
History  Chief Complaint   Patient presents with    Weakness - Generalized     Patient feels like he is going to have a seizure  Patient is a 38year old male with weakness tonight and feels like he is going to have a seizure  Noncompliant with dilantin  No recent trauma  Had seizure 1 month ago  No fever  No N/V  No SI/HI  Was last seen at [facility] ED on 7/19/22 for suture removal   [facility] website checked on this patient and last Rx filled was on 7/13/22 for tramadol for 3 day supply  (+) headache  History provided by:  Patient and EMS personnel   used: No        Prior to Admission Medications   Prescriptions Last Dose Informant Patient Reported? Taking?   hydrOXYzine HCL (ATARAX) 25 mg tablet   No No   Sig: Take 1 tablet (25 mg total) by mouth every 6 (six) hours as needed for itching   phenytoin (DILANTIN) 100 mg ER capsule   Yes No   Sig: Take 100 mg by mouth every 8 (eight) hours       Facility-Administered Medications: None       Past Medical History:   Diagnosis Date    Anxiety     Depression     Head injury     Se[address] [address] )        Past Surgical History:   Procedure Laterality Date    BRAIN SURGERY         History reviewed  No pertinent family history  I have reviewed and agree with the history as documented  E-Cigarette/Vaping     E-Cigarette/Vaping Substances     Social History     Tobacco Use    Smoking status: Current Every Day Smoker     Packs/day: 0 50     Types: Cigarettes    Smokeless tobacco: Never Used   Substance Use Topics    Alcohol use: Yes    Drug use: Yes     Types: Marijuana       Review of Systems   Constitutional: Negative for fever  Gastrointestinal: Negative for nausea and vomiting  Neurological: Positive for weakness and headaches  All other systems reviewed and are negative  Physical Exam  Physical Exam  Vitals and nursing note reviewed  Constitutional:       General: He is in acute distress (moderate)     HENT:      Head: Normocephalic and atraumatic  Mouth/Throat:      Mouth: Mucous membranes are moist    Eyes:      General: No scleral icterus  Extraocular Movements: Extraocular movements intact  Pupils: Pupils are equal, round, and reactive to light  Cardiovascular:      Rate and Rhythm: Regular rhythm  Tachycardia present  Heart sounds: Normal heart sounds  No murmur heard  Pulmonary:      Effort: Pulmonary effort is normal  No respiratory distress  Breath sounds: Normal breath sounds  No stridor  No wheezing, rhonchi or rales  Abdominal:      General: Bowel sounds are normal       Palpations: Abdomen is soft  Tenderness: There is no abdominal tenderness  Musculoskeletal:         General: No deformity  Cervical back: Normal range of motion and neck supple  Right lower leg: No edema  Left lower leg: No edema  Skin:     General: Skin is warm and dry  Findings: No erythema or rash  Neurological:      General: No focal deficit present  Mental Status: He is alert and oriented to person, place, and time     Psychiatric:         Mood and Affect: Mood normal          Vital Signs  ED Triage Vitals [08/21/22 0528]   Temperature Pulse Respirations Blood Pressure SpO2   98 1 °F (36 7 °C) 101 18 125/52 100 %      Temp Source Heart Rate Source Patient Position - Orthostatic VS BP Location FiO2 (%)   Oral Monitor Sitting Left arm --      Pain Score       --           Vitals:    08/21/22 0528 08/21/22 0630   BP: 125/52    Pulse: 101 91   Patient Position - Orthostatic VS: Sitting          Visual Acuity      ED Medications  Medications   LORazepam (ATIVAN) injection 1 mg (1 mg Intravenous Given 8/21/22 0600)   phenytoin (DILANTIN) 1,300 mg in sodium chloride 0 9 % 234 mL IVPB (loading dose) (1,300 mg Intravenous New Bag 8/21/22 0707)       Diagnostic Studies  Results Reviewed     Procedure Component Value Units Date/Time    HS Troponin 0hr (reflex protocol) [[id number]]  (Normal) Collected: 08/21/22 0605    Lab Status: Final result Specimen: Blood from Hand, Left Updated: 08/21/22 0754     hs TnI 0hr 3 ng/L     TSH, 3rd generation with Free T4 reflex [[id number]]  (Normal) Collected: 08/21/22 0605    Lab Status: Final result Specimen: Blood from Hand, Left Updated: 08/21/22 0642     TSH 3RD GENERATON 3 165 uIU/mL     Narrative:      Patients undergoing fluorescein dye angiography may retain small amounts of fluorescein in the body for 48-72 hours post procedure  Samples containing fluorescein can produce falsely depressed TSH values  If the patient had this procedure,a specimen should be resubmitted post fluorescein clearance  Comprehensive metabolic panel [[id number]]  (Abnormal) Collected: 08/21/22 0605    Lab Status: Final result Specimen: Blood from Hand, Left Updated: 08/21/22 0631     Sodium 140 mmol/L      Potassium 3 5 mmol/L      Chloride 107 mmol/L      CO2 26 mmol/L      ANION GAP 7 mmol/L      BUN 8 mg/dL      Creatinine 0 69 mg/dL      Glucose 75 mg/dL      Calcium 8 3 mg/dL      AST 16 U/L      ALT 15 U/L      Alkaline Phosphatase 65 U/L      Total Protein 6 9 g/dL      Albumin 3 8 g/dL      Total Bilirubin 0 38 mg/dL      eGFR 115 ml/min/1 73sq m     Narrative:      [place] guidelines for Chronic Kidney Disease (CKD):     Stage 1 with normal or high GFR (GFR > 90 mL/min/1 73 square meters)    Stage 2 Mild CKD (GFR = 60-89 mL/min/1 73 square meters)    Stage 3A Moderate CKD (GFR = 45-59 mL/min/1 73 square meters)    Stage 3B Moderate CKD (GFR = 30-44 mL/min/1 73 square meters)    Stage 4 Severe CKD (GFR = 15-29 mL/min/1 73 square meters)    Stage 5 End Stage CKD (GFR <15 mL/min/1 73 square meters)  Note: GFR calculation is accurate only with a steady state creatinine    Acetaminophen level-If concentration is detectable, please discuss with medical  on call   [[id number]]  (Abnormal) Collected: 08/21/22 0605    Lab Status: Final result Specimen: Blood from Hand, Left Updated: 08/21/22 0631     Acetaminophen Level <65 ug/mL     Salicylate level [[id number]]  (Normal) Collected: 08/21/22 0605    Lab Status: Final result Specimen: Blood from Hand, Left Updated: 06/12/47 8882     Salicylate Lvl <5 mg/dL     Phenytoin level, total [[id number]]  (Abnormal) Collected: 08/21/22 0605    Lab Status: Final result Specimen: Blood from Hand, Left Updated: 08/21/22 0630     Phenytoin Lvl <2 5 ug/mL     Ethanol [[id number]]  (Abnormal) Collected: 08/21/22 0605    Lab Status: Final result Specimen: Blood from Arm, Left Updated: 08/21/22 0627     Ethanol Lvl 21 mg/dL     CBC and differential [[id number]]  (Abnormal) Collected: 08/21/22 0605    Lab Status: Final result Specimen: Blood from Hand, Left Updated: 08/21/22 0616     WBC 5 54 Thousand/uL      RBC 4 69 Million/uL      Hemoglobin 11 1 g/dL      Hematocrit 36 4 %      MCV 78 fL      MCH 23 7 pg      MCHC 30 5 g/dL      RDW 14 4 %      Platelets 214 Thousands/uL      nRBC 0 /100 WBCs      Neutrophils Relative 47 %      Immat GRANS % 0 %      Lymphocytes Relative 38 %      Monocytes Relative 12 %      Eosinophils Relative 2 %      Basophils Relative 1 %      Neutrophils Absolute 2 62 Thousands/µL      Immature Grans Absolute 0 01 Thousand/uL      Lymphocytes Absolute 2 12 Thousands/µL      Monocytes Absolute 0 64 Thousand/µL      Eosinophils Absolute 0 12 Thousand/µL      Basophils Absolute 0 03 Thousands/µL     Fingerstick Glucose (POCT) [[id number]]  (Normal) Collected: 08/21/22 0554    Lab Status: Final result Updated: 08/21/22 0608     POC Glucose 101 mg/dl                  CT head without contrast   ED Interpretation by Judy Bartlett MD (08/21 5593)   FINDINGS:     PARENCHYMA:  No intracranial mass, mass effect or midline shift  No CT signs of acute infarction    No acute parenchymal hemorrhage      VENTRICLES AND EXTRA-AXIAL SPACES:  Normal for the patient's age      VISUALIZED ORBITS AND PARANASAL SINUSES:  Unremarkable      CALVARIUM AND EXTRACRANIAL SOFT TISSUES:  Intact left frontal craniotomy  No acute calvarial abnormality      IMPRESSION:     No acute intracranial abnormality                     Workstation performed: [id number]      Final Result by Emanuel Bernardo MD (08/21 2774)      No acute intracranial abnormality  Workstation performed: [id number]         XR chest 1 view portable   ED Interpretation by Nando Gonzalez MD (08/21 4328)   No acute disease read by me  Procedures  ECG 12 Lead Documentation Only    Date/Time: 8/21/2022 5:52 AM  Performed by: Nando Gonzalez MD  Authorized by: Nando Gonzalez MD     Indications / Diagnosis:  Weakness  ECG reviewed by me, the ED Provider: yes    Patient location:  ED  Previous ECG:     Previous ECG:  Compared to current    Comparison ECG info:  11/24/19    Similarity:  Changes noted (artifact now)  Quality:     Tracing quality:  Limited by artifact  Rate:     ECG rate:  87    ECG rate assessment: normal    Rhythm:     Rhythm: sinus rhythm    Ectopy:     Ectopy: none    QRS:     QRS axis:  Normal    QRS intervals:  Normal  Conduction:     Conduction: normal    ST segments:     ST segments:  Normal  T waves:     T waves: normal               ED Course  ED Course as of 08/21/22 0845   Sun Aug 21, 2022   0633 PHENYTOIN LEVEL (DILANTIN)(!): <2 5  IV dilantin ordered  0700 Labs and CT and CXR d/w patient  4186 Patient resting comfortably prior to discharge                HEART Risk Score    Flowsheet Row Most Recent Value   Heart Score Risk Calculator    History 0 Filed at: 08/21/2022 0754   ECG 0 Filed at: 08/21/2022 0754   Age 0 Filed at: 08/21/2022 0754   Risk Factors 1 Filed at: 08/21/2022 0754   Troponin 0 Filed at: 08/21/2022 0754   HEART Score 1 Filed at: 08/21/2022 7851                                      MDM  Number of Diagnoses or Management Options  Diagnosis management comments: DDx including but not limited to: metabolic abnormality, dehydration, viral illness, anemia, doubt ACS, MI; thyroid disease, intracranial process, other infectious process including UT, seizure disorder, substance abuse; doubt Guillan Vendor syndrome or myasthenia gravis or botulism or multiple sclerosis  Amount and/or Complexity of Data Reviewed  Clinical lab tests: ordered and reviewed  Tests in the radiology section of CPT®: ordered and reviewed  Decide to obtain previous medical records or to obtain history from someone other than the patient: yes  Obtain history from someone other than the patient: yes  Review and summarize past medical records: yes  Discuss the patient with other providers: yes  Independent visualization of images, tracings, or specimens: yes        Disposition  Final diagnoses:   Weakness   Seizure disorder ([facility] [address] )     Time reflects when diagnosis was documented in both MDM as applicable and the Disposition within this note     Time User Action Codes Description Comment    8/21/2022  6:47 AM Maia Corcoran Add [R53 1] Weakness     8/21/2022  6:48 AM Maia Corcoran Add [G40 909] Seizure disorder [facility])       ED Disposition     ED Disposition   Discharge    Condition   Stable    Date/Time   Sun Aug 21, 2022  8:43 AM    Comment   Taylor Sun discharge to home/self care  Follow-up Information     Follow up With Specialties Details Why Contact Info Additional [address] [place] [address]  Medicine Call in 1 day Return sooner if increased weakness, seizure, pain, fever, vomiting, diarrhea, difficulty breathing or urinating  No driving   [address] [address]  [address]  , [place], [place], [address]    [facility] Call in 1 day  [address] [facility] [place], [address] [place] [address], [address] [place] [address]          Patient's Medications   Discharge Prescriptions    PHENYTOIN (DILANTIN) 100 MG ER CAPSULE    Take 1 capsule (100 mg total) by mouth 3 (three) times a day for 10 days       Start Date: 8/21/2022 End Date: 8/31/2022       Order Dose: 100 mg       Quantity: 30 capsule    Refills: 0       No discharge procedures on file      PDMP Review       Value Time User    PDMP Reviewed  Yes 8/21/2022  5:29 AM Ailyn Daniel MD          ED Provider  Electronically Signed by           Ailyn Daniel MD  08/21/22 7102

## 2022-09-05 ENCOUNTER — APPOINTMENT (OUTPATIENT)
Dept: RADIOLOGY | Facility: HOSPITAL | Age: 42
End: 2022-09-05
Payer: COMMERCIAL

## 2022-09-05 ENCOUNTER — HOSPITAL ENCOUNTER (EMERGENCY)
Facility: HOSPITAL | Age: 42
Discharge: HOME | End: 2022-09-06
Attending: EMERGENCY MEDICINE
Payer: COMMERCIAL

## 2022-09-05 DIAGNOSIS — F10.929 ALCOHOL INTOXICATION (HCC): Primary | ICD-10-CM

## 2022-09-05 LAB
2HR DELTA HS TROPONIN: 0 NG/L
2HR DELTA HS TROPONIN: 0 NG/L
ALBUMIN SERPL BCP-MCNC: 4.1 G/DL (ref 3.5–5)
ALBUMIN SERPL BCP-MCNC: 4.1 G/DL (ref 3.5–5)
ALP SERPL-CCNC: 75 U/L (ref 34–104)
ALP SERPL-CCNC: 75 U/L (ref 34–104)
ALT SERPL W P-5'-P-CCNC: 14 U/L (ref 7–52)
ALT SERPL W P-5'-P-CCNC: 14 U/L (ref 7–52)
ANION GAP SERPL CALCULATED.3IONS-SCNC: 10 MMOL/L (ref 4–13)
ANION GAP SERPL CALCULATED.3IONS-SCNC: 10 MMOL/L (ref 4–13)
APAP SERPL-MCNC: <10 UG/ML (ref 10–20)
APAP SERPL-MCNC: <10 UG/ML (ref 10–20)
AST SERPL W P-5'-P-CCNC: 15 U/L (ref 13–39)
AST SERPL W P-5'-P-CCNC: 15 U/L (ref 13–39)
BASOPHILS # BLD AUTO: 0.03 THOUSANDS/ΜL (ref 0–0.1)
BASOPHILS # BLD AUTO: 0.03 THOUSANDS/ΜL (ref 0–0.1)
BASOPHILS NFR BLD AUTO: 1 % (ref 0–1)
BASOPHILS NFR BLD AUTO: 1 % (ref 0–1)
BILIRUB SERPL-MCNC: 0.58 MG/DL (ref 0.2–1)
BILIRUB SERPL-MCNC: 0.58 MG/DL (ref 0.2–1)
BUN SERPL-MCNC: 7 MG/DL (ref 5–25)
BUN SERPL-MCNC: 7 MG/DL (ref 5–25)
CALCIUM SERPL-MCNC: 9.2 MG/DL (ref 8.4–10.2)
CALCIUM SERPL-MCNC: 9.2 MG/DL (ref 8.4–10.2)
CARDIAC TROPONIN I PNL SERPL HS: 2 NG/L
CHLORIDE SERPL-SCNC: 109 MMOL/L (ref 96–108)
CHLORIDE SERPL-SCNC: 109 MMOL/L (ref 96–108)
CO2 SERPL-SCNC: 25 MMOL/L (ref 21–32)
CO2 SERPL-SCNC: 25 MMOL/L (ref 21–32)
CREAT SERPL-MCNC: 0.8 MG/DL (ref 0.6–1.3)
CREAT SERPL-MCNC: 0.8 MG/DL (ref 0.6–1.3)
EOSINOPHIL # BLD AUTO: 0.1 THOUSAND/ΜL (ref 0–0.61)
EOSINOPHIL # BLD AUTO: 0.1 THOUSAND/ΜL (ref 0–0.61)
EOSINOPHIL NFR BLD AUTO: 2 % (ref 0–6)
EOSINOPHIL NFR BLD AUTO: 2 % (ref 0–6)
ERYTHROCYTE [DISTWIDTH] IN BLOOD BY AUTOMATED COUNT: 13.8 % (ref 11.6–15.1)
ERYTHROCYTE [DISTWIDTH] IN BLOOD BY AUTOMATED COUNT: 13.8 % (ref 11.6–15.1)
ETHANOL SERPL-MCNC: 217 MG/DL
ETHANOL SERPL-MCNC: 217 MG/DL
FLUAV RNA RESP QL NAA+PROBE: NEGATIVE
FLUAV RNA RESP QL NAA+PROBE: NEGATIVE
FLUBV RNA RESP QL NAA+PROBE: NEGATIVE
FLUBV RNA RESP QL NAA+PROBE: NEGATIVE
GFR SERPL CREATININE-BSD FRML MDRD: 51 ML/MIN/1.73SQ M
GFR SERPL CREATININE-BSD FRML MDRD: 51 ML/MIN/1.73SQ M
GLUCOSE SERPL-MCNC: 88 MG/DL (ref 65–140)
HCT VFR BLD AUTO: 37.3 % (ref 36.5–49.3)
HCT VFR BLD AUTO: 37.3 % (ref 36.5–49.3)
HGB BLD-MCNC: 11.9 G/DL (ref 12–17)
HGB BLD-MCNC: 11.9 G/DL (ref 12–17)
IMM GRANULOCYTES # BLD AUTO: 0.02 THOUSAND/UL (ref 0–0.2)
IMM GRANULOCYTES # BLD AUTO: 0.02 THOUSAND/UL (ref 0–0.2)
IMM GRANULOCYTES NFR BLD AUTO: 0 % (ref 0–2)
IMM GRANULOCYTES NFR BLD AUTO: 0 % (ref 0–2)
LYMPHOCYTES # BLD AUTO: 2.74 THOUSANDS/ΜL (ref 0.6–4.47)
LYMPHOCYTES # BLD AUTO: 2.74 THOUSANDS/ΜL (ref 0.6–4.47)
LYMPHOCYTES NFR BLD AUTO: 42 % (ref 14–44)
LYMPHOCYTES NFR BLD AUTO: 42 % (ref 14–44)
MCH RBC QN AUTO: 24 PG (ref 26.8–34.3)
MCH RBC QN AUTO: 24 PG (ref 26.8–34.3)
MCHC RBC AUTO-ENTMCNC: 31.9 G/DL (ref 31.4–37.4)
MCHC RBC AUTO-ENTMCNC: 31.9 G/DL (ref 31.4–37.4)
MCV RBC AUTO: 75 FL (ref 82–98)
MCV RBC AUTO: 75 FL (ref 82–98)
MONOCYTES # BLD AUTO: 0.6 THOUSAND/ΜL (ref 0.17–1.22)
MONOCYTES # BLD AUTO: 0.6 THOUSAND/ΜL (ref 0.17–1.22)
MONOCYTES NFR BLD AUTO: 9 % (ref 4–12)
MONOCYTES NFR BLD AUTO: 9 % (ref 4–12)
NEUTROPHILS # BLD AUTO: 2.99 THOUSANDS/ΜL (ref 1.85–7.62)
NEUTROPHILS # BLD AUTO: 2.99 THOUSANDS/ΜL (ref 1.85–7.62)
NEUTS SEG NFR BLD AUTO: 46 % (ref 43–75)
NEUTS SEG NFR BLD AUTO: 46 % (ref 43–75)
NRBC BLD AUTO-RTO: 0 /100 WBCS
NRBC BLD AUTO-RTO: 0 /100 WBCS
PLATELET # BLD AUTO: 127 THOUSANDS/UL (ref 149–390)
PLATELET # BLD AUTO: 127 THOUSANDS/UL (ref 149–390)
POTASSIUM SERPL-SCNC: 3.1 MMOL/L (ref 3.5–5.3)
POTASSIUM SERPL-SCNC: 3.1 MMOL/L (ref 3.5–5.3)
PROT SERPL-MCNC: 7.9 G/DL (ref 6.4–8.4)
PROT SERPL-MCNC: 7.9 G/DL (ref 6.4–8.4)
RBC # BLD AUTO: 4.96 MILLION/UL (ref 3.88–5.62)
RBC # BLD AUTO: 4.96 MILLION/UL (ref 3.88–5.62)
RSV RNA RESP QL NAA+PROBE: NEGATIVE
RSV RNA RESP QL NAA+PROBE: NEGATIVE
SALICYLATES SERPL-MCNC: <5 MG/DL (ref 3–20)
SALICYLATES SERPL-MCNC: <5 MG/DL (ref 3–20)
SARS-COV-2 RNA RESP QL NAA+PROBE: NEGATIVE
SARS-COV-2 RNA RESP QL NAA+PROBE: NEGATIVE
SODIUM SERPL-SCNC: 144 MMOL/L (ref 135–147)
SODIUM SERPL-SCNC: 144 MMOL/L (ref 135–147)
WBC # BLD AUTO: 6.48 THOUSAND/UL (ref 4.31–10.16)
WBC # BLD AUTO: 6.48 THOUSAND/UL (ref 4.31–10.16)

## 2022-09-05 PROCEDURE — 71045 X-RAY EXAM CHEST 1 VIEW: CPT

## 2022-09-05 PROCEDURE — 0241U HB NFCT DS VIR RESP RNA 4 TRGT: CPT | Performed by: EMERGENCY MEDICINE

## 2022-09-05 PROCEDURE — 99285 EMERGENCY DEPT VISIT HI MDM: CPT

## 2022-09-05 PROCEDURE — 96372 THER/PROPH/DIAG INJ SC/IM: CPT

## 2022-09-05 PROCEDURE — 36415 COLL VENOUS BLD VENIPUNCTURE: CPT | Performed by: EMERGENCY MEDICINE

## 2022-09-05 PROCEDURE — 82077 ASSAY SPEC XCP UR&BREATH IA: CPT | Performed by: EMERGENCY MEDICINE

## 2022-09-05 PROCEDURE — 85025 COMPLETE CBC W/AUTO DIFF WBC: CPT | Performed by: EMERGENCY MEDICINE

## 2022-09-05 PROCEDURE — 82948 REAGENT STRIP/BLOOD GLUCOSE: CPT

## 2022-09-05 PROCEDURE — 84484 ASSAY OF TROPONIN QUANT: CPT | Performed by: EMERGENCY MEDICINE

## 2022-09-05 PROCEDURE — 80179 DRUG ASSAY SALICYLATE: CPT | Performed by: EMERGENCY MEDICINE

## 2022-09-05 PROCEDURE — 99285 EMERGENCY DEPT VISIT HI MDM: CPT | Performed by: EMERGENCY MEDICINE

## 2022-09-05 PROCEDURE — 80053 COMPREHEN METABOLIC PANEL: CPT | Performed by: EMERGENCY MEDICINE

## 2022-09-05 PROCEDURE — 80143 DRUG ASSAY ACETAMINOPHEN: CPT | Performed by: EMERGENCY MEDICINE

## 2022-09-05 PROCEDURE — 96360 HYDRATION IV INFUSION INIT: CPT

## 2022-09-05 RX ORDER — POTASSIUM CHLORIDE 20 MEQ/1
20 TABLET, EXTENDED RELEASE ORAL ONCE
Status: COMPLETED | OUTPATIENT
Start: 2022-09-05 | End: 2022-09-06

## 2022-09-05 RX ORDER — LORAZEPAM 2 MG/ML
2 INJECTION INTRAMUSCULAR ONCE
Status: COMPLETED | OUTPATIENT
Start: 2022-09-05 | End: 2022-09-05

## 2022-09-05 RX ORDER — HALOPERIDOL 5 MG/ML
5 INJECTION INTRAMUSCULAR ONCE
Status: COMPLETED | OUTPATIENT
Start: 2022-09-05 | End: 2022-09-05

## 2022-09-05 RX ORDER — LORAZEPAM 2 MG/ML
INJECTION INTRAMUSCULAR
Status: DISCONTINUED
Start: 2022-09-05 | End: 2022-09-06 | Stop reason: HOSPADM

## 2022-09-05 RX ADMIN — LORAZEPAM 2 MG: 2 INJECTION, SOLUTION INTRAMUSCULAR; INTRAVENOUS at 20:18

## 2022-09-05 RX ADMIN — SODIUM CHLORIDE 1000 ML: 0.9 INJECTION, SOLUTION INTRAVENOUS at 22:08

## 2022-09-05 RX ADMIN — HALOPERIDOL LACTATE 5 MG: 5 INJECTION, SOLUTION INTRAMUSCULAR at 20:20

## 2022-09-06 VITALS
TEMPERATURE: 97.6 F | SYSTOLIC BLOOD PRESSURE: 133 MMHG | SYSTOLIC BLOOD PRESSURE: 133 MMHG | DIASTOLIC BLOOD PRESSURE: 88 MMHG | TEMPERATURE: 97.6 F | OXYGEN SATURATION: 100 % | HEART RATE: 81 BPM | HEART RATE: 81 BPM | RESPIRATION RATE: 18 BRPM | RESPIRATION RATE: 18 BRPM | OXYGEN SATURATION: 100 % | DIASTOLIC BLOOD PRESSURE: 88 MMHG

## 2022-09-06 RX ADMIN — POTASSIUM CHLORIDE 20 MEQ: 1500 TABLET, EXTENDED RELEASE ORAL at 06:15

## 2022-09-06 NOTE — ED PROVIDER NOTES
History  Chief Complaint   Patient presents with    Medical Problem     Brought in by EMS for anxiety, upon arrival, patient refusing to answer questions, spitting on floor and grasping his throat  This is a 49-year-old male who presents the emergency department with EMS  As per EMS, the patient complained of chest pain and anxiety  The patient was stable on transport to the emergency department and only complained of anxiety in the back of the ambulance  History and review of systems are limited due to the patient's condition          None       History reviewed  No pertinent past medical history  History reviewed  No pertinent surgical history  History reviewed  No pertinent family history  I have reviewed and agree with the history as documented      E-Cigarette/Vaping     E-Cigarette/Vaping Substances          Review of Systems   Unable to perform ROS: Mental status change (Patient's condition)       Physical Exam  Physical Exam  Constitutional:  Vital signs reviewed, patient grabbing at his throat screaming and yelling saying he can not breathe, punching staff  Eyes: Pupils equal round reactive to light and accommodation, extraocular muscles intact  HEENT: trachea midline, no JVD, moist mucous membranes  Respiratory: lung sounds clear throughout, no rhonchi, no rales  Cardiovascular: regular rate rhythm, no murmurs or rubs  Abdomen: soft, nontender, nondistended, no rebound or guarding  Back: no CVA tenderness, normal inspection  Extremities: no edema, pulses equal in all 4 extremities  Neuro: awake, alert, confused, and appears intoxicated, no focal weakness  Skin: warm, dry, intact, no rashes noted    Vital Signs  ED Triage Vitals   Temperature Pulse Respirations Blood Pressure SpO2   09/05/22 2105 09/05/22 2014 09/05/22 2014 09/05/22 2013 09/05/22 2014   (!) 96 7 °F (35 9 °C) 98 20 (!) 162/105 98 %      Temp Source Heart Rate Source Patient Position - Orthostatic VS BP Location FiO2 (%) 09/05/22 2105 09/05/22 2014 09/05/22 2105 09/05/22 2105 --   Axillary Monitor Lying Right arm       Pain Score       --                  Vitals:    09/06/22 0035 09/06/22 0039 09/06/22 0150 09/06/22 0511   BP: 100/74 109/74 108/77 133/88   Pulse: 82  73 81   Patient Position - Orthostatic VS: Lying  Lying Lying         Visual Acuity      ED Medications  Medications   potassium chloride (K-DUR,KLOR-CON) CR tablet 20 mEq (0 mEq Oral Hold 9/6/22 0150)   haloperidol lactate (HALDOL) injection 5 mg (5 mg Intramuscular Given 9/5/22 2020)   LORazepam (ATIVAN) injection 2 mg (2 mg Intramuscular Given 9/5/22 2018)   sodium chloride 0 9 % bolus 1,000 mL (0 mL Intravenous Stopped 9/5/22 2302)       Diagnostic Studies  Results Reviewed     Procedure Component Value Units Date/Time    HS Troponin I 2hr [[id number]]  (Normal) Collected: 09/05/22 2315    Lab Status: Final result Specimen: Blood from Arm, Left Updated: 09/05/22 2351     hs TnI 2hr 2 ng/L      Delta 2hr hsTnI 0 ng/L     FLU/RSV/COVID - if FLU/RSV clinically relevant [[id number]]  (Normal) Collected: 09/05/22 2102    Lab Status: Final result Specimen: Nares from Nose Updated: 09/05/22 2146     SARS-CoV-2 Negative     INFLUENZA A PCR Negative     INFLUENZA B PCR Negative     RSV PCR Negative    Narrative:      FOR PEDIATRIC PATIENTS - copy/paste COVID Guidelines URL to browser: [email or web address]    SARS-CoV-2 assay is a Nucleic Acid Amplification assay intended for the  qualitative detection of nucleic acid from SARS-CoV-2 in nasopharyngeal  swabs  Results are for the presumptive identification of SARS-CoV-2 RNA  Positive results are indicative of infection with SARS-CoV-2, the virus  causing COVID-19, but do not rule out bacterial infection or co-infection  with other viruses   Laboratories within the [place] and its  territories are required to report all positive results to the appropriate  public health authorities  Negative results do not preclude SARS-CoV-2  infection and should not be used as the sole basis for treatment or other  patient management decisions  Negative results must be combined with  clinical observations, patient history, and epidemiological information  This test has not been FDA cleared or approved  This test has been authorized by FDA under an Emergency Use Authorization  (EUA)  This test is only authorized for the duration of time the  declaration that circumstances exist justifying the authorization of the  emergency use of an in vitro diagnostic tests for detection of SARS-CoV-2  virus and/or diagnosis of COVID-19 infection under section 564(b)(1) of  the Act, 21 U  S C  538WLM-4(X)(4), unless the authorization is terminated  or revoked sooner  The test has been validated but independent review by FDA  and CLIA is pending  Test performed using [employer]: This RT-PCR assay targets N2,  a region unique to SARS-CoV-2  A conserved region in the E-gene was chosen  for pan-Sarbecovirus detection which includes SARS-CoV-2      HS Troponin 0hr (reflex protocol) [[id number]]  (Normal) Collected: 09/05/22 2100    Lab Status: Final result Specimen: Blood from Arm, Left Updated: 09/05/22 2137     hs TnI 0hr 2 ng/L     Comprehensive metabolic panel [[id number]]  (Abnormal) Collected: 09/05/22 2100    Lab Status: Final result Specimen: Blood from Arm, Left Updated: 09/05/22 2130     Sodium 144 mmol/L      Potassium 3 1 mmol/L      Chloride 109 mmol/L      CO2 25 mmol/L      ANION GAP 10 mmol/L      BUN 7 mg/dL      Creatinine 0 80 mg/dL      Glucose 88 mg/dL      Calcium 9 2 mg/dL      AST 15 U/L      ALT 14 U/L      Alkaline Phosphatase 75 U/L      Total Protein 7 9 g/dL      Albumin 4 1 g/dL      Total Bilirubin 0 58 mg/dL      eGFR 51 ml/min/1 73sq m     Narrative:      Shen guidelines for Chronic Kidney Disease (CKD):     Stage 1 with normal or high GFR (GFR > 90 mL/min/1 73 square meters)    Stage 2 Mild CKD (GFR = 60-89 mL/min/1 73 square meters)    Stage 3A Moderate CKD (GFR = 45-59 mL/min/1 73 square meters)    Stage 3B Moderate CKD (GFR = 30-44 mL/min/1 73 square meters)    Stage 4 Severe CKD (GFR = 15-29 mL/min/1 73 square meters)    Stage 5 End Stage CKD (GFR <15 mL/min/1 73 square meters)  Note: GFR calculation is accurate only with a steady state creatinine    Acetaminophen level-"If concentration is detectable, please discuss with medical  on call " [[id number]]  (Abnormal) Collected: 09/05/22 2100    Lab Status: Final result Specimen: Blood from Arm, Left Updated: 09/05/22 2129     Acetaminophen Level <10 ug/mL     Ethanol [[id number]]  (Abnormal) Collected: 09/05/22 2100    Lab Status: Final result Specimen: Blood from Arm, Left Updated: 09/05/22 2129     Ethanol Lvl 350 mg/dL     Salicylate level [[id number]]  (Normal) Collected: 09/05/22 2100    Lab Status: Final result Specimen: Blood from Arm, Left Updated: 68/74/31 6922     Salicylate Lvl <5 mg/dL     CBC and differential [[id number]]  (Abnormal) Collected: 09/05/22 2100    Lab Status: Final result Specimen: Blood from Arm, Left Updated: 09/05/22 2114     WBC 6 48 Thousand/uL      RBC 4 96 Million/uL      Hemoglobin 11 9 g/dL      Hematocrit 37 3 %      MCV 75 fL      MCH 24 0 pg      MCHC 31 9 g/dL      RDW 13 8 %      Platelets 648 Thousands/uL      nRBC 0 /100 WBCs      Neutrophils Relative 46 %      Immat GRANS % 0 %      Lymphocytes Relative 42 %      Monocytes Relative 9 %      Eosinophils Relative 2 %      Basophils Relative 1 %      Neutrophils Absolute 2 99 Thousands/µL      Immature Grans Absolute 0 02 Thousand/uL      Lymphocytes Absolute 2 74 Thousands/µL      Monocytes Absolute 0 60 Thousand/µL      Eosinophils Absolute 0 10 Thousand/µL      Basophils Absolute 0 03 Thousands/µL     Fingerstick Glucose (POCT) [[id number]]  (Normal) Collected: 09/05/22 2100    Lab Status: Final result Updated: 09/05/22 2101     POC Glucose 88 mg/dl     Rapid drug screen, urine [[id number]]     Lab Status: No result Specimen: Urine                  XR chest portable   ED Interpretation by Rajani Westbrook DO (09/05 2106)   No pneumonia or pneumothorax                 Procedures  ECG 12 Lead Documentation Only    Date/Time: 9/6/2022 4:15 AM  Performed by: Rajani Westbrook DO  Authorized by: Rajani Westbrook DO     ECG reviewed by me, the ED Provider: yes    Patient location:  ED  Comments:      Normal sinus rhythm, rate of 91, normal IA, normal QTC, no STEMI             ED Course  ED Course as of 09/06/22 0609   [facility] Sep 05, 2022   2108 The patient was aggressive on intial evaluation, cursing and spitting at staff  He was attempting to hit staff and punch them  He was restrained and chemically restrained for his safety and the safety of others  Tue Sep 06, 2022   0108 The patient continues to sleep  He appears in no distress  He will continue to be monitored  0356 Pt continues to sleep  Will re-evaluate in the AM       3024 The patient is awake and alert  He has no complaints  He denies pain or SI/HI  He denies chest pain or trouble breathing  Will dc with follow up to his PCP  SBIRT 22yo+    Flowsheet Row Most Recent Value   SBIRT (25 yo +)    In order to provide better care to our patients, we are screening all of our patients for alcohol and drug use  Would it be okay to ask you these screening questions? No Filed at: 09/05/2022 2100                    MDM  Number of Diagnoses or Management Options  Diagnosis management comments: This is a 64-year-old male who presents the emergency department with EMS for anxiety  On arrival the patient was extremely agitated and punching staff  The patient will be evaluated for altered mental status as well as chest pain         Amount and/or Complexity of Data Reviewed  Clinical lab tests: reviewed and ordered  Tests in the radiology section of CPT®: reviewed and ordered  Decide to obtain previous medical records or to obtain history from someone other than the patient: yes  Review and summarize past medical records: yes  Independent visualization of images, tracings, or specimens: yes        Disposition  Final diagnoses:   Alcohol intoxication ([facility] [address] )     Time reflects when diagnosis was documented in both MDM as applicable and the Disposition within this note     Time User Action Codes Description Comment    9/6/2022  6:08 AM Fish Laughlin [F10 909] Alcohol intoxication [facility])       ED Disposition     ED Disposition   Discharge    Condition   Stable    Date/Time   Tue Sep 6, 2022  6:08 AM    Comment   Chela Cummings discharge to home/self care  Follow-up Information     Follow up With Specialties Details Why Contact Info Additional [address]  Emergency Department Emergency Medicine  If symptoms worsen [address] [facility],[address] [id number]  [address] [place] [address] Emergency Department, [address], [facility] [address]          Patient's Medications    No medications on file       No discharge procedures on file      PDMP Review     None          ED Provider  Electronically Signed by           Antonette Lane DO  09/06/22 0234

## 2022-09-06 NOTE — ED NOTES
Patient reports "Ain't nobody care about, nobody loves me  I don't want this shit  I want to go home  Fucking kill me   Don't nobody give a fuck about me "     Do Riddle RN  09/05/22 2015

## 2022-09-06 NOTE — ED NOTES
Patient aggressive towards staff, repeatedly spitting on floor, ripping off blood pressure cuffs      Upon attempt to applying restraints patient begun to speak stating "I'm going to die, don't tie me down, I can't do it"     Gia Castelan RN  09/05/22 2012

## 2022-09-06 NOTE — RESTRAINT FACE TO FACE
Restraint Face to Face   [employer] And [de-identified] 78 y o  male MRN: [id number]  Unit/Bed#: ED 06 Encounter: [id number]      Physical Evaluation: punching at staff, unable to be redirected  Purpose for Restraints/ Seclusion High risk for self harm, High risk for causing significant disruption of treatment environment  and High risk for harm to others  Patient's reaction to the intervention: agitated   Patient's medical condition: Altered mental status  Patient's Behavioral condition: Altered mental status  Restraints to be started

## 2022-10-06 ENCOUNTER — APPOINTMENT (EMERGENCY)
Dept: CT IMAGING | Facility: HOSPITAL | Age: 42
End: 2022-10-06
Payer: COMMERCIAL

## 2022-10-06 ENCOUNTER — APPOINTMENT (OUTPATIENT)
Dept: RADIOLOGY | Facility: HOSPITAL | Age: 42
End: 2022-10-06
Payer: COMMERCIAL

## 2022-10-06 ENCOUNTER — HOSPITAL ENCOUNTER (EMERGENCY)
Facility: HOSPITAL | Age: 42
Discharge: HOME | End: 2022-10-06
Attending: EMERGENCY MEDICINE
Payer: COMMERCIAL

## 2022-10-06 VITALS
BODY MASS INDEX: 25.71 KG/M2 | DIASTOLIC BLOOD PRESSURE: 76 MMHG | HEIGHT: 66 IN | OXYGEN SATURATION: 100 % | HEART RATE: 97 BPM | TEMPERATURE: 98.1 F | SYSTOLIC BLOOD PRESSURE: 121 MMHG | WEIGHT: 160 LBS | RESPIRATION RATE: 18 BRPM

## 2022-10-06 DIAGNOSIS — R56.9 SEIZURE-LIKE ACTIVITY (HCC): Primary | ICD-10-CM

## 2022-10-06 DIAGNOSIS — Z87.898 HISTORY OF SEIZURES: ICD-10-CM

## 2022-10-06 DIAGNOSIS — F10.929 ALCOHOL INTOXICATION (HCC): ICD-10-CM

## 2022-10-06 LAB
ALBUMIN SERPL BCP-MCNC: 4.3 G/DL (ref 3.5–5)
ALP SERPL-CCNC: 73 U/L (ref 34–104)
ALT SERPL W P-5'-P-CCNC: 10 U/L (ref 7–52)
ANION GAP SERPL CALCULATED.3IONS-SCNC: 10 MMOL/L (ref 4–13)
AST SERPL W P-5'-P-CCNC: 14 U/L (ref 13–39)
ATRIAL RATE: 92 BPM
BASOPHILS # BLD AUTO: 0.02 THOUSANDS/ΜL (ref 0–0.1)
BASOPHILS NFR BLD AUTO: 0 % (ref 0–1)
BILIRUB SERPL-MCNC: 0.37 MG/DL (ref 0.2–1)
BUN SERPL-MCNC: 10 MG/DL (ref 5–25)
CALCIUM SERPL-MCNC: 9.7 MG/DL (ref 8.4–10.2)
CHLORIDE SERPL-SCNC: 108 MMOL/L (ref 96–108)
CO2 SERPL-SCNC: 26 MMOL/L (ref 21–32)
CREAT SERPL-MCNC: 0.87 MG/DL (ref 0.6–1.3)
EOSINOPHIL # BLD AUTO: 0.14 THOUSAND/ΜL (ref 0–0.61)
EOSINOPHIL NFR BLD AUTO: 3 % (ref 0–6)
ERYTHROCYTE [DISTWIDTH] IN BLOOD BY AUTOMATED COUNT: 13.4 % (ref 11.6–15.1)
ETHANOL SERPL-MCNC: 108 MG/DL
GFR SERPL CREATININE-BSD FRML MDRD: 104 ML/MIN/1.73SQ M
GLUCOSE SERPL-MCNC: 93 MG/DL (ref 65–140)
HCT VFR BLD AUTO: 39.7 % (ref 36.5–49.3)
HGB BLD-MCNC: 12.2 G/DL (ref 12–17)
IMM GRANULOCYTES # BLD AUTO: 0.02 THOUSAND/UL (ref 0–0.2)
IMM GRANULOCYTES NFR BLD AUTO: 0 % (ref 0–2)
LYMPHOCYTES # BLD AUTO: 2.05 THOUSANDS/ΜL (ref 0.6–4.47)
LYMPHOCYTES NFR BLD AUTO: 42 % (ref 14–44)
MAGNESIUM SERPL-MCNC: 2 MG/DL (ref 1.9–2.7)
MCH RBC QN AUTO: 23.6 PG (ref 26.8–34.3)
MCHC RBC AUTO-ENTMCNC: 30.7 G/DL (ref 31.4–37.4)
MCV RBC AUTO: 77 FL (ref 82–98)
MONOCYTES # BLD AUTO: 0.49 THOUSAND/ΜL (ref 0.17–1.22)
MONOCYTES NFR BLD AUTO: 10 % (ref 4–12)
NEUTROPHILS # BLD AUTO: 2.14 THOUSANDS/ΜL (ref 1.85–7.62)
NEUTS SEG NFR BLD AUTO: 45 % (ref 43–75)
NRBC BLD AUTO-RTO: 0 /100 WBCS
P AXIS: 49 DEGREES
PHENYTOIN SERPL-MCNC: <2.5 UG/ML (ref 10–20)
PHOSPHATE SERPL-MCNC: 3.5 MG/DL (ref 2.7–4.5)
PLATELET # BLD AUTO: 151 THOUSANDS/UL (ref 149–390)
POTASSIUM SERPL-SCNC: 3.5 MMOL/L (ref 3.5–5.3)
PR INTERVAL: 159 MS
PROT SERPL-MCNC: 7.9 G/DL (ref 6.4–8.4)
QRS AXIS: 28 DEGREES
QRSD INTERVAL: 88 MS
QT INTERVAL: 362 MS
QTC INTERVAL: 448 MS
RBC # BLD AUTO: 5.17 MILLION/UL (ref 3.88–5.62)
SODIUM SERPL-SCNC: 144 MMOL/L (ref 135–147)
T WAVE AXIS: 63 DEGREES
VENTRICULAR RATE: 92 BPM
WBC # BLD AUTO: 4.86 THOUSAND/UL (ref 4.31–10.16)

## 2022-10-06 PROCEDURE — 84100 ASSAY OF PHOSPHORUS: CPT | Performed by: EMERGENCY MEDICINE

## 2022-10-06 PROCEDURE — 93010 ELECTROCARDIOGRAM REPORT: CPT | Performed by: INTERNAL MEDICINE

## 2022-10-06 PROCEDURE — 82077 ASSAY SPEC XCP UR&BREATH IA: CPT | Performed by: EMERGENCY MEDICINE

## 2022-10-06 PROCEDURE — 85025 COMPLETE CBC W/AUTO DIFF WBC: CPT | Performed by: EMERGENCY MEDICINE

## 2022-10-06 PROCEDURE — 36415 COLL VENOUS BLD VENIPUNCTURE: CPT | Performed by: EMERGENCY MEDICINE

## 2022-10-06 PROCEDURE — 70450 CT HEAD/BRAIN W/O DYE: CPT

## 2022-10-06 PROCEDURE — 96365 THER/PROPH/DIAG IV INF INIT: CPT

## 2022-10-06 PROCEDURE — 99285 EMERGENCY DEPT VISIT HI MDM: CPT

## 2022-10-06 PROCEDURE — 96361 HYDRATE IV INFUSION ADD-ON: CPT

## 2022-10-06 PROCEDURE — 80185 ASSAY OF PHENYTOIN TOTAL: CPT | Performed by: EMERGENCY MEDICINE

## 2022-10-06 PROCEDURE — 80053 COMPREHEN METABOLIC PANEL: CPT | Performed by: EMERGENCY MEDICINE

## 2022-10-06 PROCEDURE — 93005 ELECTROCARDIOGRAM TRACING: CPT

## 2022-10-06 PROCEDURE — 71045 X-RAY EXAM CHEST 1 VIEW: CPT

## 2022-10-06 PROCEDURE — 83735 ASSAY OF MAGNESIUM: CPT | Performed by: EMERGENCY MEDICINE

## 2022-10-06 PROCEDURE — 99285 EMERGENCY DEPT VISIT HI MDM: CPT | Performed by: EMERGENCY MEDICINE

## 2022-10-06 PROCEDURE — 96375 TX/PRO/DX INJ NEW DRUG ADDON: CPT

## 2022-10-06 RX ORDER — ACETAMINOPHEN 325 MG/1
650 TABLET ORAL ONCE
Status: COMPLETED | OUTPATIENT
Start: 2022-10-06 | End: 2022-10-06

## 2022-10-06 RX ORDER — LORAZEPAM 2 MG/ML
0.5 INJECTION INTRAMUSCULAR ONCE
Status: COMPLETED | OUTPATIENT
Start: 2022-10-06 | End: 2022-10-06

## 2022-10-06 RX ORDER — ALBUTEROL SULFATE 2.5 MG/3ML
1 SOLUTION RESPIRATORY (INHALATION) ONCE
Status: COMPLETED | OUTPATIENT
Start: 2022-10-06 | End: 2022-10-06

## 2022-10-06 RX ORDER — IPRATROPIUM BROMIDE AND ALBUTEROL SULFATE .5; 3 MG/3ML; MG/3ML
1 SOLUTION RESPIRATORY (INHALATION) ONCE
Status: COMPLETED | OUTPATIENT
Start: 2022-10-06 | End: 2022-10-06

## 2022-10-06 RX ORDER — PHENYTOIN SODIUM 100 MG/1
100 CAPSULE, EXTENDED RELEASE ORAL EVERY 8 HOURS SCHEDULED
Qty: 30 CAPSULE | Refills: 0 | Status: SHIPPED | OUTPATIENT
Start: 2022-10-06 | End: 2022-10-16

## 2022-10-06 RX ORDER — PHENYTOIN SODIUM 50 MG/ML
INJECTION, SOLUTION INTRAMUSCULAR; INTRAVENOUS
Status: DISCONTINUED
Start: 2022-10-06 | End: 2022-10-06 | Stop reason: HOSPADM

## 2022-10-06 RX ADMIN — SODIUM CHLORIDE 1000 ML: 0.9 INJECTION, SOLUTION INTRAVENOUS at 01:30

## 2022-10-06 RX ADMIN — ACETAMINOPHEN 650 MG: 325 TABLET ORAL at 02:35

## 2022-10-06 RX ADMIN — PHENYTOIN SODIUM 1000 MG: 50 INJECTION INTRAMUSCULAR; INTRAVENOUS at 01:27

## 2022-10-06 RX ADMIN — LORAZEPAM 0.5 MG: 2 INJECTION INTRAMUSCULAR; INTRAVENOUS at 01:24

## 2022-10-06 NOTE — DISCHARGE INSTRUCTIONS
Follow up with neurology and with your primary doctor, and return to the emergency department for new or worsening symptoms  CT BRAIN - WITHOUT CONTRAST     INDICATION:   seizure, head injury  COMPARISON:  None  TECHNIQUE:  CT examination of the brain was performed  In addition to axial images, sagittal and coronal 2D reformatted images were created and submitted for interpretation  Radiation dose length product (DLP) for this visit:  933 9 mGy-cm   This examination, like all CT scans performed in the [facility], was performed utilizing techniques to minimize radiation dose exposure, including the use of iterative   reconstruction and automated exposure control  IMAGE QUALITY:  Diagnostic  FINDINGS:     PARENCHYMA:  No intracranial mass, mass effect or midline shift  No CT signs of acute infarction  No acute parenchymal hemorrhage  VENTRICLES AND EXTRA-AXIAL SPACES:  Normal for the patient's age  VISUALIZED ORBITS AND PARANASAL SINUSES:  Unremarkable  CALVARIUM AND EXTRACRANIAL SOFT TISSUES:  Postoperative changes of prior pterional craniotomy are present  IMPRESSION:     No acute intracranial abnormality

## 2022-10-06 NOTE — ED PROVIDER NOTES
History  Chief Complaint   Patient presents with    Seizure - Prior Hx Of     Brought in by EMS, pt states he had a seizure earlier and has been off his medications for a month, states he feels SOB     Patient is a 24-year-old male seen in the emergency department brought by EMS with concern for possible seizure episode  Patient states that he felt short of breath and had a seizure earlier this evening  Patient notes history of seizure disorder, and states that he is supposed to take Dilantin  Patient states that he has not taken his medication approximately 1 month  Patient denies bowel and bladder incontinence  Patient states that he struck his head during this episode  Patient also notes mild shortness of breath  Patient notes headache  Patient notes no fever, chest pain, nausea, vomiting, diarrhea, weakness  None       History reviewed  No pertinent past medical history  History reviewed  No pertinent surgical history  History reviewed  No pertinent family history  I have reviewed and agree with the history as documented  E-Cigarette/Vaping     E-Cigarette/Vaping Substances     Social History     Tobacco Use    Smoking status: Current Every Day Smoker     Packs/day: 1 00    Smokeless tobacco: Never Used   Substance Use Topics    Alcohol use: Yes       Review of Systems   Constitutional: Negative for chills and fever  HENT: Negative for ear pain and sore throat  Eyes: Negative for pain and visual disturbance  Respiratory: Positive for shortness of breath  Negative for cough  Cardiovascular: Negative for chest pain and palpitations  Gastrointestinal: Negative for abdominal pain and vomiting  Genitourinary: Negative for decreased urine volume and difficulty urinating  Musculoskeletal: Negative for gait problem and neck stiffness  Skin: Negative for color change and rash  Neurological: Positive for seizures  Negative for weakness     Psychiatric/Behavioral: Negative for agitation and confusion  All other systems reviewed and are negative  Physical Exam  Physical Exam  Vitals and nursing note reviewed  Constitutional:       General: He is not in acute distress  Appearance: He is well-developed  HENT:      Head: Normocephalic and atraumatic  Right Ear: External ear normal       Left Ear: External ear normal       Nose: Nose normal       Mouth/Throat:      Pharynx: Oropharynx is clear  Eyes:      General: No scleral icterus  Conjunctiva/sclera: Conjunctivae normal    Cardiovascular:      Rate and Rhythm: Regular rhythm  Tachycardia present  Heart sounds: No murmur heard  Pulmonary:      Effort: Pulmonary effort is normal  No respiratory distress  Breath sounds: Normal breath sounds  Abdominal:      General: There is no distension  Palpations: Abdomen is soft  Tenderness: There is no abdominal tenderness  Musculoskeletal:         General: No swelling or deformity  Cervical back: Normal range of motion and neck supple  Skin:     General: Skin is warm and dry  Neurological:      General: No focal deficit present  Mental Status: He is alert  Cranial Nerves: No cranial nerve deficit  Sensory: No sensory deficit  Psychiatric:         Thought Content:  Thought content normal       Comments: appears anxious         Vital Signs  ED Triage Vitals [10/06/22 0026]   Temperature Pulse Respirations Blood Pressure SpO2   98 1 °F (36 7 °C) 100 16 122/80 98 %      Temp Source Heart Rate Source Patient Position - Orthostatic VS BP Location FiO2 (%)   Oral -- -- -- --      Pain Score       --           Vitals:    10/06/22 0026   BP: 122/80   Pulse: 100         Visual Acuity      ED Medications  Medications   sodium chloride 0 9 % bolus 1,000 mL (has no administration in time range)   phenytoin (DILANTIN) 50 mg/mL injection **ADS Override Pull** (has no administration in time range)   acetaminophen (TYLENOL) tablet 650 mg (has no administration in time range)   LORazepam (ATIVAN) injection 0 5 mg (0 5 mg Intravenous Given 10/6/22 0124)   albuterol (FOR EMS ONLY) (2 5 mg/3 mL) 0 083 % inhalation solution 2 5 mg (0 mg Does not apply Given to EMS 10/6/22 0037)   ipratropium-albuterol (FOR EMS ONLY) (DUO-NEB) 0 5-2 5 mg/3 mL inhalation solution 3 mL (0 mL Does not apply Given to EMS 10/6/22 0047)   phenytoin (DILANTIN) 1,000 mg in sodium chloride 0 9 % 180 mL IVPB (loading dose) (1,000 mg Intravenous New Bag 10/6/22 0127)       Diagnostic Studies  Results Reviewed     Procedure Component Value Units Date/Time    Ethanol [[id number]]  (Abnormal) Collected: 10/06/22 0120    Lab Status: Final result Specimen: Blood from Arm, Left Updated: 10/06/22 0217     Ethanol Lvl 108 mg/dL     Comprehensive metabolic panel [[id number]] Collected: 10/06/22 0120    Lab Status: Final result Specimen: Blood from Arm, Left Updated: 10/06/22 0215     Sodium 144 mmol/L      Potassium 3 5 mmol/L      Chloride 108 mmol/L      CO2 26 mmol/L      ANION GAP 10 mmol/L      BUN 10 mg/dL      Creatinine 0 87 mg/dL      Glucose 93 mg/dL      Calcium 9 7 mg/dL      AST 14 U/L      ALT 10 U/L      Alkaline Phosphatase 73 U/L      Total Protein 7 9 g/dL      Albumin 4 3 g/dL      Total Bilirubin 0 37 mg/dL      eGFR 104 ml/min/1 73sq m     Narrative:      [place] guidelines for Chronic Kidney Disease (CKD):     Stage 1 with normal or high GFR (GFR > 90 mL/min/1 73 square meters)    Stage 2 Mild CKD (GFR = 60-89 mL/min/1 73 square meters)    Stage 3A Moderate CKD (GFR = 45-59 mL/min/1 73 square meters)    Stage 3B Moderate CKD (GFR = 30-44 mL/min/1 73 square meters)    Stage 4 Severe CKD (GFR = 15-29 mL/min/1 73 square meters)    Stage 5 End Stage CKD (GFR <15 mL/min/1 73 square meters)  Note: GFR calculation is accurate only with a steady state creatinine    Magnesium [[id number]]  (Normal) Collected: 10/06/22 0120    Lab Status: Final result Specimen: Blood from Arm, Left Updated: 10/06/22 0215     Magnesium 2 0 mg/dL     Phosphorus [[id number]]  (Normal) Collected: 10/06/22 0120    Lab Status: Final result Specimen: Blood from Arm, Left Updated: 10/06/22 0215     Phosphorus 3 5 mg/dL     CBC and differential [[id number]]  (Abnormal) Collected: 10/06/22 0120    Lab Status: Final result Specimen: Blood from Arm, Left Updated: 10/06/22 0203     WBC 4 86 Thousand/uL      RBC 5 17 Million/uL      Hemoglobin 12 2 g/dL      Hematocrit 39 7 %      MCV 77 fL      MCH 23 6 pg      MCHC 30 7 g/dL      RDW 13 4 %      Platelets 818 Thousands/uL      nRBC 0 /100 WBCs      Neutrophils Relative 45 %      Immat GRANS % 0 %      Lymphocytes Relative 42 %      Monocytes Relative 10 %      Eosinophils Relative 3 %      Basophils Relative 0 %      Neutrophils Absolute 2 14 Thousands/µL      Immature Grans Absolute 0 02 Thousand/uL      Lymphocytes Absolute 2 05 Thousands/µL      Monocytes Absolute 0 49 Thousand/µL      Eosinophils Absolute 0 14 Thousand/µL      Basophils Absolute 0 02 Thousands/µL     Phenytoin level, total [[id number]] Collected: 10/06/22 0120    Lab Status: In process Specimen: Blood from Arm, Left Updated: 10/06/22 0159    Rapid drug screen, urine [[id number]]     Lab Status: No result Specimen: Urine     UA w Reflex to Microscopic w Reflex to Culture [[id number]]     Lab Status: No result Specimen: Urine, Clean Catch                  CT head without contrast   Final Result by Sita Carcamo MD (10/06 0120)      No acute intracranial abnormality                    Workstation performed: [id number]         XR chest 1 view portable   ED Interpretation by Shaw Torres MD (10/06 0112)   No infiltrate or pneumothorax                 Procedures  ECG 12 Lead Documentation Only    Date/Time: 10/6/2022 1:01 AM  Performed by: Shaw Torres MD  Authorized by: Shaw Torres MD     Indications / Diagnosis:  Seizure  ECG reviewed by me, the ED Provider: yes    Patient location:  ED  Rate:     ECG rate:  92    ECG rate assessment: normal    Rhythm:     Rhythm: sinus rhythm    QRS:     QRS axis:  Normal  ST segments:     ST segments:  Non-specific  T waves:     T waves: non-specific    Comments:      Sinus rhythm at 92, normal axis, , QRS 88, QTc 448, nonspecific ST-T wave abnormality, no definite evidence of acute ischemia             ED Course  ED Course as of 10/06/22 0220   u Oct 06, 2022   0129 CT BRAIN - WITHOUT CONTRAST     INDICATION:   seizure, head injury      COMPARISON:  None      TECHNIQUE:  CT examination of the brain was performed  In addition to axial images, sagittal and coronal 2D reformatted images were created and submitted for interpretation      Radiation dose length product (DLP) for this visit:  933 9 mGy-cm   This examination, like all CT scans performed in the [facility], was performed utilizing techniques to minimize radiation dose exposure, including the use of iterative   reconstruction and automated exposure control        IMAGE QUALITY:  Diagnostic      FINDINGS:     PARENCHYMA:  No intracranial mass, mass effect or midline shift  No CT signs of acute infarction  No acute parenchymal hemorrhage      VENTRICLES AND EXTRA-AXIAL SPACES:  Normal for the patient's age      VISUALIZED ORBITS AND PARANASAL SINUSES:  Unremarkable      CALVARIUM AND EXTRACRANIAL SOFT TISSUES:  Postoperative changes of prior pterional craniotomy are present      IMPRESSION:     No acute intracranial abnormality                                                    MDM  Number of Diagnoses or Management Options  Alcohol intoxication ([address] )  History of seizures  Seizure-like activity ([address] )  Diagnosis management comments: Patient is a 78-year-old male seen in the emergency department with concern for possible seizure episode  EKG was obtained and noted  CT head showed no acute intracranial abnormality    Chest x-ray showed no infiltrate or pneumothorax  Patient was treated with medication for symptom control  Laboratory evaluation remarkable for elevated serum ethanol level of 108, low MCV of 77  Patient was loaded with outpatient Dilantin  Patient had normal mental status in the emergency department  Patient's symptoms might be due to breakthrough seizure, in addition to apparent alcohol intoxication  Patient was monitored in the emergency department until clinically sober and stable for discharge  Plan to have patient follow up with PCP/neurology  Patient stable for discharge home  Discharge instructions were reviewed with patient  Amount and/or Complexity of Data Reviewed  Clinical lab tests: ordered and reviewed  Tests in the radiology section of CPT®: ordered and reviewed  Tests in the medicine section of CPT®: ordered and reviewed        Disposition  Final diagnoses:   Seizure-like activity ([address] )   History of seizures   Alcohol intoxication ([address] )     Time reflects when diagnosis was documented in both MDM as applicable and the Disposition within this note     Time User Action Codes Description Comment    10/6/2022 12:39 AM Prince Freitas Add [R56 9] Seizure-like activity ([address] )     10/6/2022  2:02 AM Prince Freitas Add [Z87 898] History of seizures     10/6/2022  2:18 AM Prince Hardamian Add [F10 929] Alcohol intoxication [facility])       ED Disposition     ED Disposition   Discharge    Condition   Stable    Date/Time   Thu Oct 6, 2022  2:18 AM    Comment   Delma Perez discharge to home/self care                 Follow-up Information     Follow up With Specialties Details Why Contact Info Additional Information    [facility] [place] [facility] Call in 1 day  [address], [address] [facility] [address] [facility], [address] [place] [address], [address] [place] [address], [address] [facility]    Your primary doctor  Call in 1 day       [address] Call  As needed [address] [id number]  [address] , [address], [address]          Patient's Medications   Discharge Prescriptions    PHENYTOIN (DILANTIN) 100 MG ER CAPSULE    Take 1 capsule (100 mg total) by mouth every 8 (eight) hours for 10 days       Start Date: 10/6/2022 End Date: 10/16/2022       Order Dose: 100 mg       Quantity: 30 capsule    Refills: 0           PDMP Review     None          ED Provider  Electronically Signed by           Rajani Campo MD  10/06/22 8738

## 2022-11-05 ENCOUNTER — HOSPITAL ENCOUNTER (EMERGENCY)
Facility: HOSPITAL | Age: 42
Discharge: HOME | End: 2022-11-05
Attending: EMERGENCY MEDICINE

## 2022-11-05 VITALS
TEMPERATURE: 98.2 F | OXYGEN SATURATION: 100 % | DIASTOLIC BLOOD PRESSURE: 95 MMHG | SYSTOLIC BLOOD PRESSURE: 134 MMHG | RESPIRATION RATE: 16 BRPM | HEART RATE: 76 BPM

## 2022-11-05 DIAGNOSIS — Z20.2 STD EXPOSURE: Primary | ICD-10-CM

## 2022-11-05 RX ORDER — METRONIDAZOLE 500 MG/1
500 TABLET ORAL ONCE
Status: COMPLETED | OUTPATIENT
Start: 2022-11-05 | End: 2022-11-05

## 2022-11-05 RX ORDER — METRONIDAZOLE 500 MG/1
500 TABLET ORAL EVERY 12 HOURS SCHEDULED
Qty: 14 TABLET | Refills: 0 | Status: SHIPPED | OUTPATIENT
Start: 2022-11-05 | End: 2022-11-12

## 2022-11-05 RX ADMIN — METRONIDAZOLE 500 MG: 500 TABLET ORAL at 10:07

## 2022-11-05 NOTE — ED PROVIDER NOTES
History  Chief Complaint   Patient presents with   • Exposure to STD     PT presents to ED from home after pt's GF has STD  Pt would like to be checked for STD, no s/s      56-year-old male presents to the emergency department for evaluation after being exposed to a sexually transmitted disease  The patient states that his girlfriend tested positive for Trichomonas and he was told that he needed to be treated  The patient states that he is asymptomatic at this time  He denies fevers, chills, nausea, vomiting, diarrhea, rashes, penile discharge or genital lesions  Prior to Admission Medications   Prescriptions Last Dose Informant Patient Reported? Taking?   hydrOXYzine HCL (ATARAX) 25 mg tablet   No No   Sig: Take 1 tablet (25 mg total) by mouth every 6 (six) hours as needed for itching   phenytoin (DILANTIN) 100 mg ER capsule   Yes No   Sig: Take 100 mg by mouth every 8 (eight) hours    phenytoin (DILANTIN) 100 mg ER capsule   No No   Sig: Take 1 capsule (100 mg total) by mouth 3 (three) times a day for 10 days   phenytoin (DILANTIN) 100 mg ER capsule   No No   Sig: Take 1 capsule (100 mg total) by mouth every 8 (eight) hours for 10 days      Facility-Administered Medications: None       Past Medical History:   Diagnosis Date   • Anxiety    • Depression    • Head injury    • Seizures ([facility] [address] )        Past Surgical History:   Procedure Laterality Date   • BRAIN SURGERY         History reviewed  No pertinent family history  I have reviewed and agree with the history as documented  E-Cigarette/Vaping   • E-Cigarette Use Never User      E-Cigarette/Vaping Substances     Social History     Tobacco Use   • Smoking status: Current Every Day Smoker     Packs/day: 1 00     Types: Cigarettes   • Smokeless tobacco: Never Used   Vaping Use   • Vaping Use: Never used   Substance Use Topics   • Alcohol use:  Yes     Alcohol/week: 4 0 standard drinks     Types: 4 Cans of beer per week   • Drug use: Yes     Frequency: 4 0 times per week     Types: Marijuana       Review of Systems   Constitutional: Negative for chills and fever  HENT: Negative for ear pain and sore throat  Eyes: Negative for pain and visual disturbance  Respiratory: Negative for cough and shortness of breath  Cardiovascular: Negative for chest pain and palpitations  Gastrointestinal: Negative for abdominal pain and vomiting  Genitourinary: Negative for dysuria and hematuria  Musculoskeletal: Negative for arthralgias and back pain  Skin: Negative for color change and rash  Neurological: Negative for seizures and syncope  All other systems reviewed and are negative  Physical Exam  Physical Exam  Vitals and nursing note reviewed  Constitutional:       Appearance: He is well-developed  HENT:      Head: Normocephalic and atraumatic  Eyes:      Conjunctiva/sclera: Conjunctivae normal    Cardiovascular:      Rate and Rhythm: Normal rate and regular rhythm  Heart sounds: No murmur heard  Pulmonary:      Effort: Pulmonary effort is normal  No respiratory distress  Breath sounds: Normal breath sounds  Abdominal:      Palpations: Abdomen is soft  Tenderness: There is no abdominal tenderness  Musculoskeletal:      Cervical back: Neck supple  Skin:     General: Skin is warm and dry  Neurological:      Mental Status: He is alert           Vital Signs  ED Triage Vitals [11/05/22 0935]   Temperature Pulse Respirations Blood Pressure SpO2   98 2 °F (36 8 °C) 76 16 134/95 100 %      Temp Source Heart Rate Source Patient Position - Orthostatic VS BP Location FiO2 (%)   Oral -- -- -- --      Pain Score       --           Vitals:    11/05/22 0935   BP: 134/95   Pulse: 76         Visual Acuity      ED Medications  Medications   metroNIDAZOLE (FLAGYL) tablet 500 mg (500 mg Oral Given 11/5/22 1007)       Diagnostic Studies  Results Reviewed     Procedure Component Value Units Date/Time    Trichomonas vaginalis/Mycoplasma genitalium PCR [[id number]] Collected: 11/05/22 1007    Lab Status: In process Specimen: Urine, Voided Updated: 11/05/22 1014    Chlamydia/GC amplified DNA by PCR [[id number]] Collected: 11/05/22 1007    Lab Status: In process Specimen: Urine, Other Updated: 11/05/22 1013                 No orders to display              Procedures  Procedures         ED Course               MDM  Number of Diagnoses or Management Options  STD exposure  Diagnosis management comments: 26-year-old male presented to the emergency department for evaluation after an STD exposure  The patient will be treated with Flagyl for his exposure to Trichomonas  Gonorrhea and chlamydia testing was also sent  The patient is appropriate for discharge at this time  Recommendation was made for him to follow up with his PCP  Return precautions were discussed  Patient agrees with the plan for discharge and feels comfortable to go home with proper f/u  Advised to return for worsening or additional problems  All questions answered  Diagnosis, care plan and treatment options were discussed  The patient understands instructions and will follow up as directed  Disposition  Final diagnoses:   STD exposure     Time reflects when diagnosis was documented in both MDM as applicable and the Disposition within this note     Time User Action Codes Description Comment    11/5/2022 10:00 AM Karlos Baer Add [Z20 2] STD exposure       ED Disposition     ED Disposition   Discharge    Condition   Stable    Date/Time   Sat Nov 5, 2022 10:01 AM    Comment   Un[employer] Lines discharge to home/self care                 Follow-up Information     Follow up With Specialties Details Why Contact Info Additional [phone]  Emergency Department Emergency Medicine Go to  If symptoms worsen [address] [facility],[address] [id number]  [address] [place] [address] Emergency Department, [address] [place] [address] Discharge Medication List as of 11/5/2022 10:02 AM      START taking these medications    Details   metroNIDAZOLE (FLAGYL) 500 mg tablet Take 1 tablet (500 mg total) by mouth every 12 (twelve) hours for 7 days, Starting Sat 11/5/2022, Until Sat 11/12/2022, Normal         CONTINUE these medications which have NOT CHANGED    Details   hydrOXYzine HCL (ATARAX) 25 mg tablet Take 1 tablet (25 mg total) by mouth every 6 (six) hours as needed for itching, Starting Sun 11/24/2019, Print      phenytoin (DILANTIN) 100 mg ER capsule Take 100 mg by mouth every 8 (eight) hours , Historical Med             No discharge procedures on file      PDMP Review       Value Time User    PDMP Reviewed  Yes 8/21/2022  5:29 AM Allegra Kingston MD          ED Provider  Electronically Signed by           Go Pettit MD  11/05/22 6952

## 2022-11-07 LAB
C TRACH DNA SPEC QL NAA+PROBE: NEGATIVE
M GENITALIUM DNA SPEC QL NAA+PROBE: NEGATIVE
N GONORRHOEA DNA SPEC QL NAA+PROBE: NEGATIVE
T VAGINALIS DNA SPEC QL NAA+PROBE: NEGATIVE

## 2022-11-28 ENCOUNTER — TELEPHONE (OUTPATIENT)
Dept: NEUROLOGY | Facility: CLINIC | Age: 42
End: 2022-11-28

## 2022-11-28 NOTE — TELEPHONE ENCOUNTER
Tried calling patient to remind him of his appt with Dr Danyelle Esparza on 12/12/22 @ 8 am  He has no mailbox that was setup  Unable to leave a voicemail message  Appt card mailed out

## 2023-01-06 ENCOUNTER — TELEPHONE (OUTPATIENT)
Dept: NEUROLOGY | Facility: CLINIC | Age: 43
End: 2023-01-06

## 2023-01-06 NOTE — TELEPHONE ENCOUNTER
Patient called into the [facility] office and left message to get information on when his appointment is scheduled to see neurology  Called patient back and was unable to leave message for patient with appointment information   Patient did not have voicemail set up

## 2023-03-16 ENCOUNTER — TELEPHONE (OUTPATIENT)
Dept: NEUROLOGY | Facility: CLINIC | Age: 43
End: 2023-03-16

## 2023-03-16 NOTE — TELEPHONE ENCOUNTER
Tried calling patient to remind him of his appt with Dr Liam Reaves for 3/24/23 @ 8:30 am  Patients voicemail in not setup so I could not leave the information

## 2023-04-26 ENCOUNTER — OFFICE VISIT (OUTPATIENT)
Dept: FAMILY MEDICINE CLINIC | Facility: CLINIC | Age: 43
End: 2023-04-26

## 2023-04-26 VITALS
DIASTOLIC BLOOD PRESSURE: 84 MMHG | HEIGHT: 66 IN | WEIGHT: 165 LBS | OXYGEN SATURATION: 99 % | BODY MASS INDEX: 26.52 KG/M2 | RESPIRATION RATE: 18 BRPM | SYSTOLIC BLOOD PRESSURE: 114 MMHG | TEMPERATURE: 97.6 F | HEART RATE: 119 BPM

## 2023-04-26 DIAGNOSIS — Z00.00 ENCOUNTER FOR PHYSICAL EXAMINATION: ICD-10-CM

## 2023-04-26 DIAGNOSIS — Z13.9 ENCOUNTER FOR SCREENING: ICD-10-CM

## 2023-04-26 DIAGNOSIS — G40.909 SEIZURE DISORDER (HCC): Primary | ICD-10-CM

## 2023-04-26 RX ORDER — PHENYTOIN SODIUM 100 MG/1
100 CAPSULE, EXTENDED RELEASE ORAL 2 TIMES DAILY
Qty: 90 CAPSULE | Refills: 5 | Status: SHIPPED | OUTPATIENT
Start: 2023-04-26

## 2023-04-28 PROBLEM — G40.909 SEIZURE DISORDER (HCC): Status: ACTIVE | Noted: 2023-04-28

## 2023-04-28 NOTE — ASSESSMENT & PLAN NOTE
Status post TBI  Last  seizure over 5 years ago  No EEG on file  Neurological exam is unremarkable  Plan  We will resume patient's Dilantin  Can consider neurology evaluation, however, patient has been stable on the medication and no recent seizure activity  At this time will prefer to manage in house  If patient has increased seizure activity will refer to neurology

## 2023-04-28 NOTE — PROGRESS NOTES
Assessment/Plan:    1  Seizure disorder [facility])  Assessment & Plan:  Status post TBI  Last  seizure over 5 years ago  No EEG on file  Neurological exam is unremarkable  Plan  We will resume patient's Dilantin  Can consider neurology evaluation, however, patient has been stable on the medication and no recent seizure activity  At this time will prefer to manage in house  If patient has increased seizure activity will refer to neurology  Orders:  -     phenytoin (Dilantin) 100 mg ER capsule; Take 1 capsule (100 mg total) by mouth 2 (two) times a day    2  Encounter for screening  Comments: We will provide up-to-date immunization in subsequent visit  Patient not at high risk and therefore not requiring pneumococcal vaccine  Will address COVID vac  Orders:  -     HIV 1/2 AG/AB w Reflex [facility] for 2 yr old and above; Future  -     Hepatitis C antibody; Future    3  Encounter for physical examination  Comments:  Doing well at this time  Refilled medications  Up-to-date on all screening  Subjective:      Patient ID: Benedict Morales is a 38 y o  male  Patient coming in with recent ED discharge  Patient had left-sided chest pain and work-up was negative for ACS  Patient also had CT PE which was negative for any pulmonary embolism  Patient reports that his chest pain has since gone away and has no acute complaints at this time  Patient would like to establish care  He states that he has had history of seizures  He states that he had a TBI injury almost 20 years ago and has been on antiseizure medication since then  Patient recently moved to [address] [place] [address] and has not been on his seizure medication as such  Patient reports that he ran out of his medication 3 months ago  Patient reports no recent seizure activity  Patient denies any neurological deficits        The following portions of the patient's history were reviewed and updated as appropriate: allergies, current medications, past family history, "past medical history, past social history, past surgical history, and problem list     Review of Systems   Constitutional: Negative for chills and fever  HENT: Negative for ear pain and sore throat  Eyes: Negative for pain and visual disturbance  Respiratory: Negative for cough and shortness of breath  Cardiovascular: Negative for chest pain and palpitations  Gastrointestinal: Negative for abdominal pain and vomiting  Genitourinary: Negative for dysuria and hematuria  Musculoskeletal: Negative for arthralgias and back pain  Skin: Negative for color change and rash  Neurological: Negative for seizures and syncope  All other systems reviewed and are negative  Objective:      /84 (BP Location: Left arm, Patient Position: Sitting, Cuff Size: Standard)   Pulse (!) 119   Temp 97 6 °F (36 4 °C) (Temporal)   Resp 18   Ht 5' 6\" (1 676 m)   Wt 74 8 kg (165 lb)   SpO2 99%   BMI 26 63 kg/m²          Physical Exam  Constitutional:       General: He is not in acute distress  Appearance: Normal appearance  He is not toxic-appearing or diaphoretic  HENT:      Head: Normocephalic  Mouth/Throat:      Mouth: Mucous membranes are moist    Eyes:      Extraocular Movements: Extraocular movements intact  Pupils: Pupils are equal, round, and reactive to light  Cardiovascular:      Rate and Rhythm: Normal rate and regular rhythm  Pulmonary:      Effort: Pulmonary effort is normal  No tachypnea  Breath sounds: Normal breath sounds  No wheezing or rales  Chest:      Chest wall: No edema  There is no dullness to percussion  Abdominal:      General: Abdomen is flat  There is no distension  Palpations: Abdomen is soft  Tenderness: There is no abdominal tenderness  Musculoskeletal:      Right lower leg: No edema  Left lower leg: No edema  Skin:     Capillary Refill: Capillary refill takes less than 2 seconds     Neurological:      General: No focal deficit " present  Mental Status: He is alert and oriented to person, place, and time     Psychiatric:         Mood and Affect: Mood normal          Behavior: Behavior normal            Anaid Acuña DO   Family Medicine PGY-2  4/28/2023

## 2024-06-06 ENCOUNTER — TELEPHONE (OUTPATIENT)
Dept: PSYCHIATRY | Facility: CLINIC | Age: 44
End: 2024-06-06

## 2024-06-06 NOTE — TELEPHONE ENCOUNTER
Patient has been added to the Medication Management and Talk Therapy wait list without a referral.    Insurance: [employer]    Insurance Type:    Commercial []   Medicaid [x]   [facility] (if applicable)   Medicare []  Location Preference: [place]  Provider Preference: none  Virtual: Yes [] No [x]  Were outside resources sent: Yes [] No [x]  Advised the patient to contact his PCP for a referral.

## 2024-10-04 ENCOUNTER — TELEPHONE (OUTPATIENT)
Age: 44
End: 2024-10-04

## 2024-10-04 ENCOUNTER — OFFICE VISIT (OUTPATIENT)
Dept: FAMILY MEDICINE CLINIC | Facility: CLINIC | Age: 44
End: 2024-10-04

## 2024-10-04 ENCOUNTER — TELEPHONE (OUTPATIENT)
Dept: FAMILY MEDICINE CLINIC | Facility: CLINIC | Age: 44
End: 2024-10-04

## 2024-10-04 VITALS
HEIGHT: 67 IN | HEART RATE: 85 BPM | WEIGHT: 184.8 LBS | OXYGEN SATURATION: 96 % | RESPIRATION RATE: 16 BRPM | SYSTOLIC BLOOD PRESSURE: 126 MMHG | DIASTOLIC BLOOD PRESSURE: 80 MMHG | TEMPERATURE: 97.1 F | BODY MASS INDEX: 29 KG/M2

## 2024-10-04 DIAGNOSIS — G40.909 SEIZURE DISORDER (HCC): Primary | ICD-10-CM

## 2024-10-04 DIAGNOSIS — G40.909 SEIZURE DISORDER (HCC): ICD-10-CM

## 2024-10-04 DIAGNOSIS — M54.6 CHRONIC MIDLINE THORACIC BACK PAIN: ICD-10-CM

## 2024-10-04 DIAGNOSIS — G89.29 CHRONIC MIDLINE THORACIC BACK PAIN: ICD-10-CM

## 2024-10-04 DIAGNOSIS — Z11.59 ENCOUNTER FOR HEPATITIS C SCREENING TEST FOR LOW RISK PATIENT: ICD-10-CM

## 2024-10-04 DIAGNOSIS — F41.9 ANXIETY: ICD-10-CM

## 2024-10-04 DIAGNOSIS — Z11.4 ENCOUNTER FOR SCREENING FOR HUMAN IMMUNODEFICIENCY VIRUS (HIV): ICD-10-CM

## 2024-10-04 DIAGNOSIS — Z72.0 TOBACCO USE: ICD-10-CM

## 2024-10-04 PROCEDURE — 99213 OFFICE O/P EST LOW 20 MIN: CPT | Performed by: INTERNAL MEDICINE

## 2024-10-04 RX ORDER — IBUPROFEN 600 MG/1
600 TABLET, FILM COATED ORAL EVERY 6 HOURS PRN
Qty: 30 TABLET | Refills: 1 | Status: SHIPPED | OUTPATIENT
Start: 2024-10-04

## 2024-10-04 RX ORDER — PHENYTOIN SODIUM 100 MG/1
100 CAPSULE, EXTENDED RELEASE ORAL 2 TIMES DAILY
Qty: 120 CAPSULE | Refills: 6 | Status: SHIPPED | OUTPATIENT
Start: 2024-10-04 | End: 2024-10-04 | Stop reason: SDUPTHER

## 2024-10-04 RX ORDER — IBUPROFEN 600 MG/1
600 TABLET, FILM COATED ORAL EVERY 6 HOURS PRN
Qty: 30 TABLET | Refills: 1 | Status: SHIPPED | OUTPATIENT
Start: 2024-10-04 | End: 2024-10-04 | Stop reason: SDUPTHER

## 2024-10-04 RX ORDER — PHENYTOIN SODIUM 100 MG/1
100 CAPSULE, EXTENDED RELEASE ORAL 2 TIMES DAILY
Qty: 120 CAPSULE | Refills: 6 | Status: SHIPPED | OUTPATIENT
Start: 2024-10-04
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
## 2024-10-04 NOTE — TELEPHONE ENCOUNTER
Pt came into office stating pharmacy informed him insurance is not covering his meds due to resident sending them. Please have MD resend medication.
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
## 2024-10-04 NOTE — PATIENT INSTRUCTIONS
^^^^^^^^^^^^^^^^^^^^^^^^^^^  Your safety and wellbeing as well as that of those around you is important to us. Should you or someone you know be in need, here are some area resources that may help:    Do you feel like you might be in crisis and potentially need professional services immediately?     [place] Suicide Prevention Lifeline: Dial #890  If you prefer to text, you can reach the Crisis Text Line by texting “[place]” to 413745    ¿Te encuentras en crisis? Envía un mensaje de texto con la palabra AYUDA al [phone] para comunicarte de manera gratuita con un Consejero de Crisis. Apoyo gratuito las 24 horas del día, los 7 días de la semana, al alcance de tu mano.    The Dat Project   The Dat Project is the leading suicide prevention and crisis intervention nonprofit organization for LGBTQ young people. We provide information & support to LGBTQ ?young people 24/7, all year round.   Call Us: 1-307.579.9615 or Text Us: 435-146    Alternatively, you can Visit https://www.dhs.[place].gov/Services/Mental-Health-In-[place]/Documents/Suicide_Prevention_Hotlines.pdf to find your [facility]'s 24/7 crisis phone line.    Are you feeling overwhelmed, want to speak with a supportive person (NOT for crisis), and/or are you looking for additional resources?     [facility] Answers Warmline: Call ([phone].  This Warmline provides telephone service for Adult residents (18 years and older) of [facility] who need emotional support, help with problem solving, or information and referral. (Hours 7 Days a week 6:00 AM and 2:00 AM)    [facility]: 24/7 Helpline: [phone] or Toll-free: [phone] TTY: 636.604.7464 or online: Familytic.org/our-services/  [facility] is a safe place where ALL survivors of domestic and intimate partner abuse and their children can find refuge. [facility] offers confidential resources to individuals and families including supportive mental health services and connection to  resources. We provide services in [place] and [facility].    [place] Coalition Against Domestic Violence: Dial 1-471.244.1358 or Visit https://www.pcadv.org  Among the services provided to domestic violence victims are: crisis intervention; counseling; accompaniment to police, medical, and court facilities; and temporary emergency shelter for victims and their dependent children. Prevention and educational programs are provided to lessen the risk of domestic violence in the community at large.    Find a local Alcohol Anonymous meeting: Dial 1-363.371.4253 or Visit https://www.aaDydra/qz-qqkucep-mmblnrei/[place]/  Find a local Narcotics Anonymous meeting: Visit https://www.na.org/meetingsearch/     thesocialCV.com is a website where you can look for accessible care and many other services including financial assistance, food pantries, medical care, and other free or reduced-cost resources in your area, even if you don't have insurance. Visit: https://[employer].Webtogs/    Career link is your resource for all things job and employment related. [facility], [facility] [place] [address]  Walk in or call Monday through Friday 8:00AM - 4:30 PM: [phone]/TTY: [phone]  www.careerlinkleJohn [facility].org    If you need to connect with resources in your community, but don't know where to look, [facility] is a great place to start. From help with a utilities bill, to housing assistance, after-school programs for kids, and more, you can dial 211 or text your zip code to [address] to talk with a  for free.    [facility]:  This resource assists individuals in need of connection to healthcare services of all types including primary care, substance dependence and acute psychiatric care on a limited basis.  Team Phone: [phone] or [phone]    [facility] Information & Referral Office is the entry point for [facility]  "Human Services departments and information about community resources. Professional Caseworkers will work with you to determination whether a referral requires further assessment and may either refer you to a community agency, or, complete a formal referral to a Human Services specialized office (Aging and Adult Services, Children and Youth, Mental Health, Developmental Programs or Early Intervention).     [facility], [address] [place] [id number]  Dial: [phone]  Walk-in hours are 8:30AM to 4:15PM, Monday through Friday-- no appointment is needed.    If you feel at risk of immediate harm to yourself or another, call 9-1-1 or go directly to the Emergency Department.  ^^^^^^^^^^^^^^^^^^^^^^^^^^^^^^    Patient Education     Back exercises   The Basics   Written by the doctors and editors at [facility]   Why do I need to do back exercises? -- Back exercises can help ease back pain and might help prevent future back pain. Long term, it is important to strengthen the muscles in your lower back, buttocks, and belly. These are your \"core muscles.\" Stretching exercises are also important to keep your muscles flexible.  Below are some stretching and strengthening exercises that might help you. Other forms of movement can help ease or prevent back pain, too. For example, some people like to walk, do aerobic exercise, or do yoga or gerald chi. The most important thing is to move your body. Your doctor, nurse, or physical therapist can help you find different types of activity that work for you.  What stretching exercises should I do? -- Below are some examples of stretching exercises. Warm up your muscles before stretching to help prevent injury. To warm up, you can walk, jog, or cycle for a few minutes.  Start by repeating each of these stretches 2 to 3 times. Try to hold each stretch for 5 to 10 seconds, and try to do the stretches 2 to 3 times each day. Breathe slowly and deeply as you do the " "exercises. Never bounce when doing stretches.   Cat-cow stretch (figure 1) - Start on all fours on the floor, with your hands under your shoulders, knees under your hips, and your back flat. First, tuck your chin and tighten your stomach muscles as you round your back (like a \"cat\"). Hold the stretch for about 10 seconds. Rest for a few seconds as you flatten your back. Next, lift your chin and let your belly and lower back sink toward the floor (like a \"cow\"). Hold the stretch for about 10 seconds.   Single knee-to-chest stretches (figure 2) ? While lying on your back, bend your knees with your feet flat on the floor. Pull 1 knee toward your chest until you feel a stretch in your lower back and buttock area. Lower, and repeat with the other knee. If you have knee problems, pull your knee up by grabbing the back of your thigh instead of the front of your knee. You can also do this exercise by grabbing both knees at the same time.   Lower trunk rotations (figure 3) ? While lying on your back, bend your knees with your feet flat on the floor. Keep your knees and ankles together, and then drop them to 1 side. Keep both of your shoulders touching the floor until you feel a stretch in the muscles at the side of the back. Repeat on the other side.   Lower back stretches seated (figure 4) ? Sit in a chair with your feet spread about shoulder width apart. Then, lean forward until you feel a stretch in your lower back.  What strengthening exercises should I do? -- Below are some examples of strengthening exercises.  Start by doing each exercise 2 to 3 times. Work up to doing each exercise 10 times. Hold each exercise for 3 to 5 seconds, and try to do the exercises 2 to 3 times each day. Do all exercises slowly.   Shoulder blade squeezes (figure 5) ? Pinch your shoulder blades together on your upper back, and hold 3 to 5 seconds. You can also do these 1 side at a time. Sit with good posture, and make sure that your shoulders " "do not rise up when you do this exercise. Relax.   Pelvic tilts (figure 6) ? Lie on your back with your knees bent and feet flat on the floor. Tighten your stomach muscles, and press your lower back down to the floor. Relax. You should be able to breathe comfortably during this exercise.   Hip lifts (figure 7) ? Lie on your back with your knees bent and feet flat on the floor. Tighten your stomach muscles, keep your back flat, and lift your buttocks off of the floor. Relax. You should feel this in your buttocks, not in your lower back.  What else should I know?    Exercise, including stretching, might be slightly uncomfortable. But you should not have sharp or severe pain. If you do get severe pain, stop what you are doing. If severe pain continues, call your doctor or nurse.   Do not hold your breath when exercising. If you tend to hold your breath, try counting out loud when exercising. If any exercise bothers you, stop right away.   Always warm up before exercising. Warm muscles stretch much easier than cool muscles. Stretching cool muscles can lead to injury.   Doing exercises before a meal can be a good way to get into a routine.  All topics are updated as new evidence becomes available and our peer review process is complete.  This topic retrieved from [employer] on: Apr 03, 2024.  Topic 442521 Version 2.0  Release: 32.2.4 - C32.92  © 2024 UpToDate, Inc. and/or its affiliates. All rights reserved.  figure 1: Cat-cow stretch     Start on all fours on the floor, with hands under your shoulders, knees under your hips, and your back flat. First, tuck your chin and tighten your stomach muscles as you round your back (like a \"cat\"). Hold the stretch for about 10 seconds. Rest for a few seconds as you flatten your back. Next, lift your chin and let your belly and lower back sink toward the floor (like a \"cow\"). Hold the stretch for about 10 seconds.  Graphic [id number] Version 1.0  figure 2: Single knee-to-chest stretch   "   Lie on your back, bend your knees, and have your feet flat on the floor. Pull 1 knee toward your chest until you feel a stretch in your lower back and buttock area. Repeat with the other knee. If you have knee problems, pull your knee up by grabbing the back of your thigh instead of the front of your knee. You can also do this exercise by grabbing both knees at the same time.  Graphic [id number] Version 1.0  figure 3: Lower trunk rotation     While lying on your back, bend your knees and have your feet flat on the floor. Keep your legs together and then drop them to 1 side. Keep both of your shoulders touching the floor until you feel a stretch in the muscles at the side of the back. Repeat on the other side.  Graphic [id number] Version 1.0  figure 4: Lower back stretch     Sit in a chair with your feet spread about shoulder width apart. Then, lean forward until you feel a stretch in your lower back.  Graphic 536889 Version 1.0  figure 5: Shoulder blade squeezes     Pinch your shoulder blades together on your upper back and hold for 3 to 5 seconds. Make sure that you are sitting with good posture and that your shoulders do not raise up when you do this exercise. Relax.  Graphic [id number] Version 1.0  figure 6: Pelvic tilts     Lie on your back with your knees bent and feet flat on the floor. Tighten your stomach muscles and press your lower back down to the floor. Relax.  Graphic [id number] Version 1.0  figure 7: Hip lifts     Lie on your back with your knees bent and feet flat on the floor. Tighten your stomach muscles and lift your buttocks off of the floor. Relax.  Graphic 299555 Version 1.0  Consumer Information Use and Disclaimer   Disclaimer: This generalized information is a limited summary of diagnosis, treatment, and/or medication information. It is not meant to be comprehensive and should be used as a tool to help the user understand and/or assess potential diagnostic and treatment options. It does NOT include all  information about conditions, treatments, medications, side effects, or risks that may apply to a specific patient. It is not intended to be medical advice or a substitute for the medical advice, diagnosis, or treatment of a health care provider based on the health care provider's examination and assessment of a patient's specific and unique circumstances. Patients must speak with a health care provider for complete information about their health, medical questions, and treatment options, including any risks or benefits regarding use of medications. This information does not endorse any treatments or medications as safe, effective, or approved for treating a specific patient. UpToDate, Inc. and its affiliates disclaim any warranty or liability relating to this information or the use thereof.The use of this information is governed by the Terms of Use, available at https://www.woltersMalauzai Softwareuwer.com/en/know/clinical-effectiveness-terms. 2024© UpToDate, Inc. and its affiliates and/or licensors. All rights reserved.  Copyright   © 2024 UpToDate, Inc. and/or its affiliates. All rights reserved.

## 2024-10-04 NOTE — ASSESSMENT & PLAN NOTE
-1/2 ppd since age 13   -15 pack year history   -Currently smokes; not ready to quit    PLAN   -Tobacco cessation counseling
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
## 2024-12-21 ENCOUNTER — HOSPITAL ENCOUNTER (EMERGENCY)
Facility: HOSPITAL | Age: 44
Discharge: LEFT BEFORE DISCHARGE | End: 2024-12-21
Attending: EMERGENCY MEDICINE
Payer: COMMERCIAL

## 2024-12-21 VITALS
SYSTOLIC BLOOD PRESSURE: 130 MMHG | BODY MASS INDEX: 30.9 KG/M2 | WEIGHT: 192.24 LBS | DIASTOLIC BLOOD PRESSURE: 79 MMHG | OXYGEN SATURATION: 99 % | RESPIRATION RATE: 18 BRPM | HEART RATE: 99 BPM | TEMPERATURE: 97.5 F | HEIGHT: 66 IN

## 2024-12-21 DIAGNOSIS — R79.89 ELEVATED D-DIMER: ICD-10-CM

## 2024-12-21 DIAGNOSIS — R07.9 CHEST PAIN: Primary | ICD-10-CM

## 2024-12-21 LAB
ALBUMIN SERPL BCG-MCNC: 4.5 G/DL (ref 3.5–5)
ALP SERPL-CCNC: 97 U/L (ref 34–104)
ALT SERPL W P-5'-P-CCNC: 19 U/L (ref 7–52)
ANION GAP SERPL CALCULATED.3IONS-SCNC: 10 MMOL/L (ref 4–13)
AST SERPL W P-5'-P-CCNC: 20 U/L (ref 13–39)
ATRIAL RATE: 106 BPM
BASOPHILS # BLD AUTO: 0.02 THOUSANDS/ÂΜL (ref 0–0.1)
BASOPHILS NFR BLD AUTO: 0 % (ref 0–1)
BILIRUB SERPL-MCNC: 0.43 MG/DL (ref 0.2–1)
BUN SERPL-MCNC: 8 MG/DL (ref 5–25)
CALCIUM SERPL-MCNC: 9.3 MG/DL (ref 8.4–10.2)
CARDIAC TROPONIN I PNL SERPL HS: 3 NG/L (ref ?–50)
CHLORIDE SERPL-SCNC: 102 MMOL/L (ref 96–108)
CO2 SERPL-SCNC: 24 MMOL/L (ref 21–32)
CREAT SERPL-MCNC: 0.93 MG/DL (ref 0.6–1.3)
D DIMER PPP FEU-MCNC: 0.79 UG/ML FEU
EOSINOPHIL # BLD AUTO: 0.05 THOUSAND/ÂΜL (ref 0–0.61)
EOSINOPHIL NFR BLD AUTO: 1 % (ref 0–6)
ERYTHROCYTE [DISTWIDTH] IN BLOOD BY AUTOMATED COUNT: 13.8 % (ref 11.6–15.1)
GFR SERPL CREATININE-BSD FRML MDRD: 98 ML/MIN/1.73SQ M
GLUCOSE SERPL-MCNC: 73 MG/DL (ref 65–140)
HCT VFR BLD AUTO: 42.5 % (ref 36.5–49.3)
HGB BLD-MCNC: 12.6 G/DL (ref 12–17)
IMM GRANULOCYTES # BLD AUTO: 0.02 THOUSAND/UL (ref 0–0.2)
IMM GRANULOCYTES NFR BLD AUTO: 0 % (ref 0–2)
LYMPHOCYTES # BLD AUTO: 0.87 THOUSANDS/ÂΜL (ref 0.6–4.47)
LYMPHOCYTES NFR BLD AUTO: 15 % (ref 14–44)
MCH RBC QN AUTO: 23.7 PG (ref 26.8–34.3)
MCHC RBC AUTO-ENTMCNC: 29.6 G/DL (ref 31.4–37.4)
MCV RBC AUTO: 80 FL (ref 82–98)
MONOCYTES # BLD AUTO: 0.55 THOUSAND/ÂΜL (ref 0.17–1.22)
MONOCYTES NFR BLD AUTO: 9 % (ref 4–12)
NEUTROPHILS # BLD AUTO: 4.43 THOUSANDS/ÂΜL (ref 1.85–7.62)
NEUTS SEG NFR BLD AUTO: 75 % (ref 43–75)
NRBC BLD AUTO-RTO: 0 /100 WBCS
P AXIS: 45 DEGREES
PLATELET # BLD AUTO: 115 THOUSANDS/UL (ref 149–390)
PMV BLD AUTO: 10.5 FL (ref 8.9–12.7)
POTASSIUM SERPL-SCNC: 3.4 MMOL/L (ref 3.5–5.3)
PR INTERVAL: 146 MS
PROT SERPL-MCNC: 8.8 G/DL (ref 6.4–8.4)
QRS AXIS: 24 DEGREES
QRSD INTERVAL: 80 MS
QT INTERVAL: 302 MS
QTC INTERVAL: 401 MS
RBC # BLD AUTO: 5.31 MILLION/UL (ref 3.88–5.62)
SODIUM SERPL-SCNC: 136 MMOL/L (ref 135–147)
T WAVE AXIS: 56 DEGREES
VENTRICULAR RATE: 106 BPM
WBC # BLD AUTO: 5.94 THOUSAND/UL (ref 4.31–10.16)

## 2024-12-21 PROCEDURE — 85025 COMPLETE CBC W/AUTO DIFF WBC: CPT

## 2024-12-21 PROCEDURE — 93010 ELECTROCARDIOGRAM REPORT: CPT | Performed by: INTERNAL MEDICINE

## 2024-12-21 PROCEDURE — 99285 EMERGENCY DEPT VISIT HI MDM: CPT

## 2024-12-21 PROCEDURE — 93005 ELECTROCARDIOGRAM TRACING: CPT

## 2024-12-21 PROCEDURE — 84484 ASSAY OF TROPONIN QUANT: CPT

## 2024-12-21 PROCEDURE — 80053 COMPREHEN METABOLIC PANEL: CPT

## 2024-12-21 PROCEDURE — 36415 COLL VENOUS BLD VENIPUNCTURE: CPT

## 2024-12-21 PROCEDURE — 85379 FIBRIN DEGRADATION QUANT: CPT

## 2024-12-21 RX ORDER — IBUPROFEN 400 MG/1
800 TABLET, FILM COATED ORAL ONCE
Status: COMPLETED | OUTPATIENT
Start: 2024-12-21 | End: 2024-12-21

## 2024-12-21 RX ADMIN — IBUPROFEN 800 MG: 400 TABLET, FILM COATED ORAL at 04:25
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
## 2024-12-21 NOTE — Clinical Note
Devon Lira was seen and treated in our emergency department on 12/21/2024.                Diagnosis:     Devon  may return to work on return date.    He may return on this date: 12/21/2024         If you have any questions or concerns, please don't hesitate to call.      Niesha Ruiz PA-C    ______________________________           _______________          _______________  Hospital Representative                              Date                                Time

## 2025-01-13 ENCOUNTER — OFFICE VISIT (OUTPATIENT)
Dept: FAMILY MEDICINE CLINIC | Facility: CLINIC | Age: 45
End: 2025-01-13

## 2025-01-13 VITALS
TEMPERATURE: 98.2 F | BODY MASS INDEX: 30.7 KG/M2 | HEART RATE: 102 BPM | OXYGEN SATURATION: 97 % | RESPIRATION RATE: 18 BRPM | WEIGHT: 191 LBS | DIASTOLIC BLOOD PRESSURE: 80 MMHG | HEIGHT: 66 IN | SYSTOLIC BLOOD PRESSURE: 126 MMHG

## 2025-01-13 DIAGNOSIS — Z72.0 TOBACCO USE: ICD-10-CM

## 2025-01-13 DIAGNOSIS — R79.89 ELEVATED D-DIMER: Primary | ICD-10-CM

## 2025-01-13 DIAGNOSIS — G40.909 SEIZURE DISORDER (HCC): ICD-10-CM

## 2025-01-13 PROCEDURE — 99213 OFFICE O/P EST LOW 20 MIN: CPT | Performed by: FAMILY MEDICINE

## 2025-01-13 RX ORDER — PHENYTOIN SODIUM 100 MG/1
100 CAPSULE, EXTENDED RELEASE ORAL 2 TIMES DAILY
Qty: 120 CAPSULE | Refills: 6 | Status: SHIPPED | OUTPATIENT
Start: 2025-01-13
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
## 2025-02-07 ENCOUNTER — TELEPHONE (OUTPATIENT)
Age: 45
End: 2025-02-07

## 2025-02-07 NOTE — TELEPHONE ENCOUNTER
Patient returning call. Patient is requesting to be removed from wait list as he is seeing another provider.

## 2025-02-07 NOTE — TELEPHONE ENCOUNTER
Contacted patient in regards to Medication Management Wait List. Mailbox full and unable to lvm.    Per Linda  Leatha Aggarwal

## 2025-02-10 ENCOUNTER — TELEPHONE (OUTPATIENT)
Dept: FAMILY MEDICINE CLINIC | Facility: CLINIC | Age: 45
End: 2025-02-10

## 2025-02-10 NOTE — TELEPHONE ENCOUNTER
Pt has 2/14 appt and needs to be rescheduled due to provider not being available. Called and could not leave message due to vm not being set up. If pt calls back please assist.

## 2025-02-16 PROBLEM — Z00.00 HEALTHCARE MAINTENANCE: Status: ACTIVE | Noted: 2025-02-16

## 2025-02-24 ENCOUNTER — OFFICE VISIT (OUTPATIENT)
Dept: FAMILY MEDICINE CLINIC | Facility: CLINIC | Age: 45
End: 2025-02-24

## 2025-02-24 VITALS
HEART RATE: 102 BPM | BODY MASS INDEX: 30.53 KG/M2 | WEIGHT: 190 LBS | SYSTOLIC BLOOD PRESSURE: 138 MMHG | TEMPERATURE: 99.3 F | DIASTOLIC BLOOD PRESSURE: 70 MMHG | OXYGEN SATURATION: 99 % | RESPIRATION RATE: 18 BRPM | HEIGHT: 66 IN

## 2025-02-24 DIAGNOSIS — G40.909 SEIZURE DISORDER (HCC): ICD-10-CM

## 2025-02-24 DIAGNOSIS — R68.89 FLU-LIKE SYMPTOMS: ICD-10-CM

## 2025-02-24 DIAGNOSIS — Z00.00 ANNUAL PHYSICAL EXAM: Primary | ICD-10-CM

## 2025-02-24 DIAGNOSIS — Z00.00 HEALTHCARE MAINTENANCE: ICD-10-CM

## 2025-02-24 DIAGNOSIS — R06.83 SNORING: ICD-10-CM

## 2025-02-24 DIAGNOSIS — Z59.82 INABILITY TO ACQUIRE TRANSPORTATION: ICD-10-CM

## 2025-02-24 DIAGNOSIS — Z72.0 TOBACCO USE: ICD-10-CM

## 2025-02-24 PROCEDURE — 99213 OFFICE O/P EST LOW 20 MIN: CPT | Performed by: FAMILY MEDICINE

## 2025-02-24 PROCEDURE — 99406 BEHAV CHNG SMOKING 3-10 MIN: CPT | Performed by: FAMILY MEDICINE

## 2025-02-24 PROCEDURE — 99396 PREV VISIT EST AGE 40-64: CPT | Performed by: FAMILY MEDICINE

## 2025-02-24 RX ORDER — PHENYTOIN SODIUM 100 MG/1
100 CAPSULE, EXTENDED RELEASE ORAL 2 TIMES DAILY
Qty: 120 CAPSULE | Refills: 6 | Status: SHIPPED | OUTPATIENT
Start: 2025-02-24

## 2025-02-24 RX ORDER — IBUPROFEN 600 MG/1
600 TABLET, FILM COATED ORAL EVERY 6 HOURS PRN
Qty: 30 TABLET | Refills: 1 | Status: SHIPPED | OUTPATIENT
Start: 2025-02-24

## 2025-02-24 RX ORDER — OXYMETAZOLINE HYDROCHLORIDE 0.05 G/100ML
2 SPRAY NASAL 2 TIMES DAILY
Qty: 15 ML | Refills: 0 | Status: SHIPPED | OUTPATIENT
Start: 2025-02-24

## 2025-02-24 SDOH — ECONOMIC STABILITY - TRANSPORTATION SECURITY: TRANSPORTATION INSECURITY: Z59.82

## 2025-02-24 NOTE — ASSESSMENT & PLAN NOTE
-Smokes 1/2 ppd  -started age 12, quit at age 28 for about 6 years and started again at 34   -Never more than 1/2 ppd   -Started at 6 cigarettes a day   -Makes for about 13 pack year history slightly less does not qualify for CT low dose lung cancer screening   -Last time did cold [place] and plans to do cold turkey again, no date set    PLAN  -I spent 4 minutes counseling on tobacco cessation   -Discussed picking a date; goal is before his birthday

## 2025-02-24 NOTE — PATIENT INSTRUCTIONS
"Patient Education     Routine physical for adults   The Basics   Written by the doctors and editors at [facility]   What is a physical? -- A physical is a routine visit, or \"check-up,\" with your doctor. You might also hear it called a \"wellness visit\" or \"preventive visit.\"  During each visit, the doctor will:   Ask about your physical and mental health   Ask about your habits, behaviors, and lifestyle   Do an exam   Give you vaccines if needed   Talk to you about any medicines you take   Give advice about your health   Answer your questions  Getting regular check-ups is an important part of taking care of your health. It can help your doctor find and treat any problems you have. But it's also important for preventing health problems.  A routine physical is different from a \"sick visit.\" A sick visit is when you see a doctor because of a health concern or problem. Since physicals are scheduled ahead of time, you can think about what you want to ask the doctor.  How often should I get a physical? -- It depends on your age and health. In general, for people age 21 years and older:   If you are younger than 50 years, you might be able to get a physical every 3 years.   If you are 50 years or older, your doctor might recommend a physical every year.  If you have an ongoing health condition, like diabetes or high blood pressure, your doctor will probably want to see you more often.  What happens during a physical? -- In general, each visit will include:   Physical exam - The doctor or nurse will check your height, weight, heart rate, and blood pressure. They will also look at your eyes and ears. They will ask about how you are feeling and whether you have any symptoms that bother you.   Medicines - It's a good idea to bring a list of all the medicines you take to each doctor visit. Your doctor will talk to you about your medicines and answer any questions. Tell them if you are having any side effects that bother you. You " "should also tell them if you are having trouble paying for any of your medicines.   Habits and behaviors - This includes:   Your diet   Your exercise habits   Whether you smoke, drink alcohol, or use drugs   Whether you are sexually active   Whether you feel safe at home  Your doctor will talk to you about things you can do to improve your health and lower your risk of health problems. They will also offer help and support. For example, if you want to quit smoking, they can give you advice and might prescribe medicines. If you want to improve your diet or get more physical activity, they can help you with this, too.   Lab tests, if needed - The tests you get will depend on your age and situation. For example, your doctor might want to check your:   Cholesterol   Blood sugar   Iron level   Vaccines - The recommended vaccines will depend on your age, health, and what vaccines you already had. Vaccines are very important because they can prevent certain serious or deadly infections.   Discussion of screening - \"Screening\" means checking for diseases or other health problems before they cause symptoms. Your doctor can recommend screening based on your age, risk, and preferences. This might include tests to check for:   Cancer, such as breast, prostate, cervical, ovarian, colorectal, prostate, lung, or skin cancer   Sexually transmitted infections, such as chlamydia and gonorrhea   Mental health conditions like depression and anxiety  Your doctor will talk to you about the different types of screening tests. They can help you decide which screenings to have. They can also explain what the results might mean.   Answering questions - The physical is a good time to ask the doctor or nurse questions about your health. If needed, they can refer you to other doctors or specialists, too.  Adults older than 65 years often need other care, too. As you get older, your doctor will talk to you about:   How to prevent falling at " home   Hearing or vision tests   Memory testing   How to take your medicines safely   Making sure that you have the help and support you need at home  All topics are updated as new evidence becomes available and our peer review process is complete.  This topic retrieved from [employer] on: May 02, 2024.  Topic 405973 Version 1.0  Release: 32.4.3 - C32.122  © 2024 UpToDate, Inc. and/or its affiliates. All rights reserved.  Consumer Information Use and Disclaimer   Disclaimer: This generalized information is a limited summary of diagnosis, treatment, and/or medication information. It is not meant to be comprehensive and should be used as a tool to help the user understand and/or assess potential diagnostic and treatment options. It does NOT include all information about conditions, treatments, medications, side effects, or risks that may apply to a specific patient. It is not intended to be medical advice or a substitute for the medical advice, diagnosis, or treatment of a health care provider based on the health care provider's examination and assessment of a patient's specific and unique circumstances. Patients must speak with a health care provider for complete information about their health, medical questions, and treatment options, including any risks or benefits regarding use of medications. This information does not endorse any treatments or medications as safe, effective, or approved for treating a specific patient. UpToDate, Inc. and its affiliates disclaim any warranty or liability relating to this information or the use thereof.The use of this information is governed by the Terms of Use, available at https://www.woltersEndosenseuwer.com/en/know/clinical-effectiveness-terms. 2024© UpToDate, Inc. and its affiliates and/or licensors. All rights reserved.  Copyright   © 2024 UpToDate, Inc. and/or its affiliates. All rights reserved.

## 2025-02-24 NOTE — ASSESSMENT & PLAN NOTE
Do you snore loudly  yes  Do you often feel tired, fatigued, or sleepy during the daytime no  Has anyone observed you stop breathing during sleep yes  Do you have (or are you being treated for) high blood pressure no    BMI: ... </>35 (>35 +1) 0  Age: ... </>50 (>50 +1) 0  Neck circumference: ... </>40 (>40 +1) 0  Gender: ... F/M (male +1) +1    Interpretation:  0-2 low risk for moderate to severe DELIA  5-8 high risk for moderate to severe delia  3-4 need further classification    PLAN  -Score of 3 at this time no further work-up consider referral In future to sleep medicine if able to obtain more information

## 2025-02-24 NOTE — ASSESSMENT & PLAN NOTE
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)  -Previously referred to neurology has not gone because has been busy with work and life  -Previously refilled phenytoin however has not started to use states pharmacy said wouldn't give it to him  -Had a seizure in October 2024 in [place]   -Currently does not drive and currently does not work     PLAN  -Encouraged neurology f/u   -As does not drive or work poses no risk to community  -Strongly encouraged he start taking his phenytoin again; re-prescribed today and requested he ensure he gets it from the pharmacy and if there are any problems to please reach out to the staff and let me know  -f/u one month to ensure he got his medications and check in that he made a neurology f/u    Orders:    phenytoin (Dilantin) 100 mg ER capsule; Take 1 capsule (100 mg total) by mouth 2 (two) times a day (Patient not taking: Reported on 3/2/2025)

## 2025-02-24 NOTE — ASSESSMENT & PLAN NOTE
Colon Cancer: on f/u discuss cologard  Prostate Cancer: start discussion age 53  Lung Cancer: current smoker does not meet criteria for CT lung cancer screening

## 2025-02-24 NOTE — PROGRESS NOTES
Adult Annual Physical  Name: Devon Lira      : 1980      MRN: [id number]  Encounter Provider: Rebecca Fay Kab-Perlman, MD  Encounter Date: 2025   Encounter department: [facility]    Assessment & Plan  Annual physical exam  -completed today        Healthcare maintenance  Colon Cancer: on f/u discuss cologard  Prostate Cancer: start discussion age 53  Lung Cancer: current smoker does not meet criteria for CT lung cancer screening        Tobacco use  -Smokes 1/2 ppd  -started age 12, quit at age 28 for about 6 years and started again at 34   -Never more than 1/2 ppd   -Started at 6 cigarettes a day   -Makes for about 13 pack year history slightly less does not qualify for CT low dose lung cancer screening   -Last time did cold [place] and plans to do cold turkey again, no date set    PLAN  -I spent 4 minutes counseling on tobacco cessation   -Discussed picking a date; goal is before his birthday          Inability to acquire transportation  -referral to social work placed today[facility]    Orders:    Ambulatory referral to social work care management program; Future    Flu-like symptoms  -Began with rhinorrhea on Saturday then developed cough, sneezing, and then subjective fever with headaches relieved with 400mg aleve  -suspect URI; at this point no benefit in testing for flu/covid and with low suspicion it's either    PLAN  -Provided with ibuprofen, and afrin with strict discussion not to use afrin for more than 3 days to avoid severe adverse effects; patient expressed understanding    Orders:    ibuprofen (MOTRIN) 600 mg tablet; Take 1 tablet (600 mg total) by mouth every 6 (six) hours as needed for mild pain    oxymetazoline (AFRIN) 0.05 % nasal spray; 2 sprays by Each Nare route 2 (two) times a day    Seizure disorder (HCC)  -Has not used seizure medication since May 2024  -Acquired seizure disorder s/p TBI in  (bert  whipped)  -Previously referred to neurology has not gone because has been busy with work and life  -Previously refilled phenytoin however has not started to use states pharmacy said wouldn't give it to him  -Had a seizure in October 2024 in [place]   -Currently does not drive and currently does not work     PLAN  -Encouraged neurology f/u   -As does not drive or work poses no risk to community  -Strongly encouraged he start taking his phenytoin again; re-prescribed today and requested he ensure he gets it from the pharmacy and if there are any problems to please reach out to the staff and let me know  -f/u one month to ensure he got his medications and check in that he made a neurology f/u    Orders:    phenytoin (Dilantin) 100 mg ER capsule; Take 1 capsule (100 mg total) by mouth 2 (two) times a day (Patient not taking: Reported on 3/2/2025)    Snoring  Do you snore loudly  yes  Do you often feel tired, fatigued, or sleepy during the daytime no  Has anyone observed you stop breathing during sleep yes  Do you have (or are you being treated for) high blood pressure no    BMI: ... </>35 (>35 +1) 0  Age: ... </>50 (>50 +1) 0  Neck circumference: ... </>40 (>40 +1) 0  Gender: ... F/M (male +1) +1    Interpretation:  0-2 low risk for moderate to severe DELIA  5-8 high risk for moderate to severe delia  3-4 need further classification    PLAN  -Score of 3 at this time no further work-up consider referral In future to sleep medicine if able to obtain more information         Immunizations and preventive care screenings were discussed with patient today. Appropriate education was printed on patient's after visit summary.      Counseling:  Alcohol/drug use: discussed moderation in alcohol intake, the recommendations for healthy alcohol use, and avoidance of illicit drug use.  Dental Health: discussed importance of regular tooth brushing, flossing, and dental visits.  Injury prevention: discussed safety/seat belts, safety helmets,  smoke detectors, carbon monoxide detectors, and smoking near bedding or upholstery.  Sexual health: discussed sexually transmitted diseases, partner selection, use of condoms, avoidance of unintended pregnancy, and contraceptive alternatives.  Exercise: the importance of regular exercise/physical activity was discussed. Recommend exercise 3-5 times per week for at least 30 minutes.     BMI Counseling: Body mass index is 30.67 kg/m². The BMI is above normal. Nutrition recommendations include decreasing portion sizes, encouraging healthy choices of fruits and vegetables, decreasing fast food intake, consuming healthier snacks, limiting drinks that contain sugar, moderation in carbohydrate intake, increasing intake of lean protein, reducing intake of saturated and trans fat and reducing intake of cholesterol. Exercise recommendations include exercising 3-5 times per week. No pharmacotherapy was ordered. Patient referred to PCP. Rationale for BMI follow-up plan is due to patient being overweight or obese.     Depression Screening and Follow-up Plan: Patient was screened for depression during today's encounter. They screened negative with a PHQ-2 score of 0.          History of Present Illness     Adult Annual Physical:  Patient presents for annual physical.     Diet and Physical Activity:  - Diet/Nutrition: well balanced diet, consuming 3-5 servings of fruits/vegetables daily and limited junk food.  - Exercise: walking and 30-60 minutes on average.    Depression Screening:  - PHQ-2 Score: 0    General Health:  - Sleep: sleeps well, 4-6 hours of sleep on average, snores loudly and witnessed gasping.  - Hearing: normal hearing bilateral ears.  - Vision: no vision problems and most recent eye exam > 1 year ago.  - Dental: no dental visits for > 1 year, brushes teeth once daily and does not floss.     Health:  - History of STDs: no.   - Urinary symptoms: none.     Advanced Care Planning:  - Has an advanced directive?: no     - Has a durable medical POA?: no    - ACP document given to patient?: yes      Review of Systems   Constitutional:  Negative for fever.   Respiratory:  Negative for shortness of breath and wheezing.    Cardiovascular:  Negative for chest pain and palpitations.   Skin:  Negative for rash.   Hematological:  Negative for adenopathy. Does not bruise/bleed easily.     Pertinent Medical History   Seizure disorder    Medical History Reviewed by provider this encounter:  Meds             Medical History Reviewed by provider this encounter:  Tobacco  Allergies  Meds  Problems  Med Hx  Surg Hx  Fam Hx  Soc   Hx    .  Past Medical History   Past Medical History:   Diagnosis Date    Anxiety     Depression     Head injury     Seizures (HCC)      Past Surgical History:   Procedure Laterality Date    BRAIN SURGERY       Family History   Problem Relation Age of Onset    No Known Problems Mother     Cancer Father     No Known Problems Sister     No Known Problems Brother     No Known Problems Daughter       reports that he has been smoking cigarettes. He has been exposed to tobacco smoke. He has never used smokeless tobacco. He reports current alcohol use of about 4.0 standard drinks of alcohol per week. He reports that he does not currently use drugs after having used the following drugs: Marijuana. Frequency: 4.00 times per week.  Current Outpatient Medications   Medication Instructions    ibuprofen (MOTRIN) 600 mg, Oral, Every 6 hours PRN    oxymetazoline (AFRIN) 0.05 % nasal spray 2 sprays, Each Nare, 2 times daily    phenytoin (DILANTIN) 100 mg, Oral, 2 times daily    sertraline (ZOLOFT) 50 mg, Daily   No Known Allergies   Current Outpatient Medications on File Prior to Visit   Medication Sig Dispense Refill    sertraline (ZOLOFT) 50 mg tablet Take 50 mg by mouth daily       No current facility-administered medications on file prior to visit.      Social History     Tobacco Use    Smoking status: Every Day     Current  "packs/day: 1.00     Types: Cigarettes     Passive exposure: Current    Smokeless tobacco: Never   Vaping Use    Vaping status: Never Used   Substance and Sexual Activity    Alcohol use: Yes     Alcohol/week: 4.0 standard drinks of alcohol     Types: 4 Cans of beer per week    Drug use: Not Currently     Frequency: 4.0 times per week     Types: Marijuana    Sexual activity: Never       Objective   /70 (BP Location: Right arm, Patient Position: Sitting, Cuff Size: Standard)   Pulse 102   Temp 99.3 °F (37.4 °C) (Temporal)   Resp 18   Ht 5' 6\" (1.676 m)   Wt 86.2 kg (190 lb)   SpO2 99%   BMI 30.67 kg/m²     Physical Exam  Constitutional:       Appearance: Normal appearance.   HENT:      Head: Normocephalic and atraumatic.      Right Ear: Tympanic membrane, ear canal and external ear normal.      Left Ear: Tympanic membrane, ear canal and external ear normal.      Nose: Nose normal.      Mouth/Throat:      Mouth: Mucous membranes are moist.      Pharynx: Oropharynx is clear.   Eyes:      Extraocular Movements: Extraocular movements intact.      Conjunctiva/sclera: Conjunctivae normal.      Pupils: Pupils are equal, round, and reactive to light.   Cardiovascular:      Rate and Rhythm: Normal rate and regular rhythm.      Heart sounds: Normal heart sounds. No murmur heard.     No friction rub. No gallop.   Pulmonary:      Effort: Pulmonary effort is normal.      Breath sounds: Normal breath sounds. No wheezing, rhonchi or rales.   Abdominal:      General: Abdomen is flat. Bowel sounds are normal. There is no distension.      Palpations: Abdomen is soft. There is no mass.      Tenderness: There is no abdominal tenderness. There is no guarding or rebound.      Hernia: No hernia is present.   Musculoskeletal:         General: No swelling or deformity. Normal range of motion.      Cervical back: Normal range of motion and neck supple. No rigidity or tenderness.      Right lower leg: No edema.      Left lower leg: " No edema.   Lymphadenopathy:      Cervical: No cervical adenopathy.   Skin:     General: Skin is warm.   Neurological:      General: No focal deficit present.      Mental Status: He is alert and oriented to person, place, and time.      Cranial Nerves: No cranial nerve deficit.      Sensory: Sensory deficit (left arm and anterior shoulder to T4 by nipple) present.      Motor: Weakness (3+ left arm 5+ right arm, and 4+ left leg and 5+ right leg) present.      Coordination: Coordination abnormal (dysdiadokinesisi of left hand).      Gait: Gait abnormal (mildly unsteady).   Psychiatric:         Mood and Affect: Mood normal.         Behavior: Behavior normal.         Thought Content: Thought content normal.         Judgment: Judgment normal.

## 2025-02-27 ENCOUNTER — PATIENT OUTREACH (OUTPATIENT)
Dept: FAMILY MEDICINE CLINIC | Facility: CLINIC | Age: 45
End: 2025-02-27

## 2025-02-27 NOTE — PROGRESS NOTES
OP SW had received a referral from Rebecca Fay Kab-Perlman, MD r/t transportation. OP SW had completed a chart review. Per chart, reason for referral: patient with at risk responses for financial resource strain, transportation needs, utilities, housing stability, and/or food insecurity (SDOH). OP SW will outreach patient to further assess needs.    OP SW had called the patient via phone. OP SW unable to leave voicemail on mobile number as mailbox is full. OP SW unable to leave voicemail on home number as caller is not available. OP SW will attempt to call again at a late date and time. OP SW will continue to be available.

## 2025-03-06 ENCOUNTER — PATIENT OUTREACH (OUTPATIENT)
Dept: FAMILY MEDICINE CLINIC | Facility: CLINIC | Age: 45
End: 2025-03-06

## 2025-03-06 NOTE — PROGRESS NOTES
OP LISHA had called the patient via phone. OP LISHA unable to leave a voicemail as mailbox is full. OP LISHA notes this is the second phone call attempt. OP LISHA sent unable to reach letter via mail. OP LISHA closed referral. Please reconsult SW for future needs.

## 2025-03-06 NOTE — LETTER
03/06/25    Dear Devon Lira,    I tried to reach you by phone and was unfortunately unable to reach you. I am the Outpatient Care Manager -  from [facility]. I am following up on a referral placed by your PCP. Please give me a call at [phone] from 8am-4:30pm, Mon-Fri.    Sincerely,         SEN Goode

## 2025-03-13 ENCOUNTER — TELEPHONE (OUTPATIENT)
Dept: FAMILY MEDICINE CLINIC | Facility: CLINIC | Age: 45
End: 2025-03-13

## 2025-03-13 NOTE — TELEPHONE ENCOUNTER
Pt has 3/25 appt that needs to be rescheduled due to pcp not in office. Could not leave message due to mailbox full. f pt calls please assist.

## 2025-03-18 PROBLEM — Z00.00 HEALTHCARE MAINTENANCE: Status: RESOLVED | Noted: 2025-02-16 | Resolved: 2025-03-18

## 2025-03-19 ENCOUNTER — TELEPHONE (OUTPATIENT)
Dept: FAMILY MEDICINE CLINIC | Facility: CLINIC | Age: 45
End: 2025-03-19

## 2025-03-21 ENCOUNTER — TELEPHONE (OUTPATIENT)
Dept: FAMILY MEDICINE CLINIC | Facility: CLINIC | Age: 45
End: 2025-03-21

## 2025-03-21 NOTE — TELEPHONE ENCOUNTER
Received MRO request from  [place] [employer] received on 3/21/25. Request was scanned into chart and faxed to [facility].

## 2025-04-10 ENCOUNTER — VBI (OUTPATIENT)
Dept: ADMINISTRATIVE | Facility: OTHER | Age: 45
End: 2025-04-10

## 2025-04-10 NOTE — TELEPHONE ENCOUNTER
04/10/25 9:57 AM     Chart reviewed for CRC: Colonoscopy was/were not submitted to the patient's insurance.     Christine Ballesteros MA   [facility]

## 2025-04-15 ENCOUNTER — TELEPHONE (OUTPATIENT)
Dept: FAMILY MEDICINE CLINIC | Facility: CLINIC | Age: 45
End: 2025-04-15

## 2025-04-15 NOTE — TELEPHONE ENCOUNTER
Received MRO request from [employer] received on 4/15/25. Request was scanned into chart and faxed to [employer].

## 2025-07-28 ENCOUNTER — OFFICE VISIT (OUTPATIENT)
Dept: FAMILY MEDICINE CLINIC | Facility: CLINIC | Age: 45
End: 2025-07-28

## 2025-07-28 VITALS
HEART RATE: 95 BPM | SYSTOLIC BLOOD PRESSURE: 128 MMHG | HEIGHT: 66 IN | RESPIRATION RATE: 16 BRPM | OXYGEN SATURATION: 98 % | TEMPERATURE: 97.5 F | WEIGHT: 178.9 LBS | BODY MASS INDEX: 28.75 KG/M2 | DIASTOLIC BLOOD PRESSURE: 68 MMHG

## 2025-07-28 DIAGNOSIS — B35.1 ONYCHOMYCOSIS: ICD-10-CM

## 2025-07-28 DIAGNOSIS — Z72.0 TOBACCO USE: ICD-10-CM

## 2025-07-28 DIAGNOSIS — G40.909 SEIZURE DISORDER (HCC): Primary | ICD-10-CM

## 2025-07-28 DIAGNOSIS — Z11.4 SCREENING FOR HIV (HUMAN IMMUNODEFICIENCY VIRUS): ICD-10-CM

## 2025-07-28 DIAGNOSIS — B35.3 TINEA PEDIS OF BOTH FEET: ICD-10-CM

## 2025-07-28 DIAGNOSIS — R07.9 EXERTIONAL CHEST PAIN: ICD-10-CM

## 2025-07-28 PROCEDURE — 99213 OFFICE O/P EST LOW 20 MIN: CPT | Performed by: STUDENT IN AN ORGANIZED HEALTH CARE EDUCATION/TRAINING PROGRAM

## 2025-07-28 RX ORDER — AMMONIUM LACTATE 12 G/100G
LOTION TOPICAL 2 TIMES DAILY PRN
Qty: 225 G | Refills: 1 | Status: SHIPPED | OUTPATIENT
Start: 2025-07-28

## 2025-07-28 RX ORDER — KETOCONAZOLE 20 MG/G
CREAM TOPICAL DAILY
Qty: 30 G | Refills: 1 | Status: SHIPPED | OUTPATIENT
Start: 2025-07-28

## 2025-07-28 RX ORDER — PHENYTOIN SODIUM 100 MG/1
100 CAPSULE, EXTENDED RELEASE ORAL 2 TIMES DAILY
Qty: 120 CAPSULE | Refills: 6 | Status: SHIPPED | OUTPATIENT
Start: 2025-07-28